# Patient Record
Sex: MALE | Race: WHITE | Employment: FULL TIME | URBAN - METROPOLITAN AREA
[De-identification: names, ages, dates, MRNs, and addresses within clinical notes are randomized per-mention and may not be internally consistent; named-entity substitution may affect disease eponyms.]

---

## 2018-05-30 ENCOUNTER — HOSPITAL ENCOUNTER (OUTPATIENT)
Dept: BEHAVIORAL HEALTH | Facility: CLINIC | Age: 33
Discharge: HOME | End: 2018-05-30
Attending: SOCIAL WORKER | Admitting: SOCIAL WORKER
Payer: COMMERCIAL

## 2018-05-30 VITALS
SYSTOLIC BLOOD PRESSURE: 118 MMHG | HEART RATE: 91 BPM | WEIGHT: 170 LBS | BODY MASS INDEX: 21.14 KG/M2 | DIASTOLIC BLOOD PRESSURE: 81 MMHG | HEIGHT: 75 IN

## 2018-05-30 PROCEDURE — H0001 ALCOHOL AND/OR DRUG ASSESS: HCPCS

## 2018-05-30 ASSESSMENT — ANXIETY QUESTIONNAIRES
3. WORRYING TOO MUCH ABOUT DIFFERENT THINGS: NEARLY EVERY DAY
2. NOT BEING ABLE TO STOP OR CONTROL WORRYING: NEARLY EVERY DAY
GAD7 TOTAL SCORE: 21
1. FEELING NERVOUS, ANXIOUS, OR ON EDGE: NEARLY EVERY DAY
6. BECOMING EASILY ANNOYED OR IRRITABLE: NEARLY EVERY DAY
4. TROUBLE RELAXING: NEARLY EVERY DAY
7. FEELING AFRAID AS IF SOMETHING AWFUL MIGHT HAPPEN: NEARLY EVERY DAY
5. BEING SO RESTLESS THAT IT IS HARD TO SIT STILL: NEARLY EVERY DAY

## 2018-05-30 NOTE — PROGRESS NOTES
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 for too long.    **Per 2016 Rule 25: accidental OD after 3 pints HL and several line of coke two months ago. His friend gave him some heroin not knowing that Jelani had already consumed 3 pints of HL.    40. Have you ever been diagnosed with Fetal Alcohol Effects or Fetal Alcohol Syndrome?  No    41. What are your some of your personal strengths?  detail-oriented, can brian in and focus, can due things well.     Dimension III Ratings Summary   Emotional/Behavioral/Cognitive - The placing authority must use the criteria in Dimension III to determine a client s emotional, behavioral, and cognitive conditions and complications.   RISK DESCRIPTIONS - Severity ratin Client has difficulty with impulse control and lacks coping skills. Client has thoughts of suicide or harm to others without means; however, the thoughts may interfere with participation in some treatment activities. Client has difficulty functioning in significant life areas. Client has moderate symptoms of emotional, behavioral, or cognitive problems. Client is able to participate in most treatment activities.    REASONS SEVERITY WAS ASSIGNED - What current issues might with thinking, feelings or behavior pose barriers to participation in a treatment program? What  "coping skills or other assets does the person have to offset those issues? Are these problems that can be initially accommodated by a treatment provider? If not, what specialized skills or attributes must a provider have?    Patient was diagnosed with depression, anxiety, and OCD. He was prescribed Prozac and Ativan. He was also prescribed Hydroxyzine as back up to Ativan. He hasn't taken them in the past year. Patient s PHQ-9 score was 27 out of 27, indicating severe depression. Patient s ÁNGEL-7 score was 21 out of 21, indicating severe anxiety. Patient reported suicidal ideation. He denied suicidal intent and plan. He denied self-injurious ideation and intent at this time. Patient reported one suicide attempts in the past year. Patient denied a history of trauma and/or abuse. He would benefit from beginning individual mental health therapy to address OCD and obsessions.      DIMENSION IV - Readiness to Change     1. What is your motivation for participating in this evaluation today?    He doesn't want to lose his job or his girlfriend.     2. What problematic behaviors have you engaged in when using alcohol or other drugs that could be hazardous to you or others (i.e. driving a car/motorcycle/boat, operating machinery, unsafe sex, IV drug use, sharing needles, etc.)  (DSM-5: Sx #8)    Driving, unsafe situations, operating machinery.   **Per 12/08/2016 Rule 25: Driving,unsafe sex,being in unsafe neighborhoods, associating with unsafe people, sharing needles, hung self outside of 22 story building    3. If applicable, when did you first think you had a problem with your alcohol or other drug use?    Age 14 - when he realized he had problem with all drugs in general. Doesn't matter the catalyst, but he needs to get more and doesn't matter which drug it is.     4. Who in your life has shared concerns with you about your use of alcohol or other drugs?    \"Myself, girlfriend, parents.\"     5. Are there any changes you " have made or plan to make regarding how you had been using alcohol or other drugs?    He is now biking to and from work daily. He is trying to stay more active at home and find projects to do. He stated he knows he needs to stop drinking completely. There is no in between. He wants an outpatient treatment. He stated that most people would suggest an inpatient treatment, but he can't do that due to his life situations. He wants to maintain his job.     Dimension IV Ratings Summary   Readiness for Change - The placing authority must use the criteria in Dimension IV to determine a client s readiness for change.   RISK DESCRIPTIONS - Severity ratin Client displays verbal compliance, but lacks consistent behaviors; has low motivation for change; and is passively involved in treatment.    REASONS SEVERITY WAS ASSIGNED - (What information did the person provide that supports your assessment of his or her readiness to change? How aware is the person of problems caused by continued use? How willing is she or he to make changes? What does the person feel would be helpful? What has the person been able to do without help?)      Patient expressed a desire to abstain from alcohol. He identified that he probably needs inpatient treatment, but does not want to miss work. He appears motivated to make changes due to his girlfriend's threat to end the relationship.      DIMENSION V - Relapse, Continued Use and Continued Problem Potential     1. If you have had previous periods of sobriety, when was your longest period of sobriety and what were you doing at that time that was supporting your sobriety?  (DSM-5: Sx #2)    5 months sober in 2016 - he stated he was fearful of going back to assisted and losing everything. He ended up losing his job and things anyway. When he thinks of drinking, he stated he needs to associate it with the negatives and not how much better he will feel for a short time.      **Per 2016 Rule 25: His  longest period of sobriety was 5 months after incident Jan 2016.  In the past has quit for a month or 2 here and there. He quit with just will power.    2. Within the past 30 days, on a scale from 0-10 (0 = having no cravings at all and 10 = having very strong cravings to use alcohol or other drugs) what number would you assign to your cravings? (DSM-5: Sx #4)     10    3. Can you identify any specific reasons or specific triggers that contribute to you being more likely to consume alcohol or other drugs? (DSM-5: Sx #4)    Stress about finances and work and deadlines. He has a hard time sleeping without alcohol. He relied on drinking to keep his sleep cycle. He now can't drink before bed because his girlfriend won't let him drink. 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 He is not sleeping much and that's adding to his stress. He stated he also has social anxiety and drinking helps him interact better.     4. Have you been treated for alcohol/other substance use disorder? (DSM-5: Sx #2)    Age 13 - treatment **Per 12/08/2016 Rule 25: He was forced to go into inpt tx at 15, he completed it but actually drank in the tx but never got caught.  Age 17 - treatment  [facility].     5. Support group participation: Have you/do you attend 12-step or other support group meetings? How recently? What was your experience? Are you willing to restart? If the person has not participated, is he or she willing?  (DSM-5: Sx #2)    He goes to AA once every couple of months. He never really thought about getting a sponsor.      6. Do you drink alcohol or use other drugs in larger amounts than intended or over a longer period of time than was intended?  (DSM-5: Sx #1)    Yes, explain: he drinks over the course of the day.     7. Do you spend a great deal of time engaged in activities necessary to obtain alcohol or other drugs, a great deal of time using alcohol or other drugs, or a great deal of time  recovering from alcohol or other drug use?  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 He drinks at work, has led meetings after drinking a pint.     Dimension V Ratings Summary   Relapse/Continued Use/Continued problem potential - The placing authority must use the criteria in Dimension V to determine a client s relapse, continued use, and continued problem potential.   RISK DESCRIPTIONS - Severity ratin No awareness of the negative impact of mental health problems or substance abuse. No coping skills to arrest mental health or addiction illnesses, or prevent relapse.    REASONS SEVERITY WAS ASSIGNED - (What information did the person provide that indicates his or her understanding of relapse issues? What about the person s experience indicates how prone he or she is to relapse? What coping skills does the person have that decrease relapse potential?)      Patient reported three past treatments and no support group attendance. He reported having minimal sober time. He has tried to quit drinking and using in the past but returned to use. Patient lacks knowledge of the addiction cycle. He lacks insight into his personal relapse process along with warning signs and triggers. Patient lacks insight into the effects his use has had on his physical and mental health. He lacks impulse control, sober coping skills, and long-term sober maintenance skills. Patient is at a high risk for relapse/continued use. He has untreated co-occurring mental health and substance use issues, which places him at higher risk for relapse.      DIMENSION VI - Recovery Environment     1. Are you employed or attending school?    Patient works 50 hours per week at [employer]. Hobbies: build computers, run [employer], biking, [employer] machines.     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 He hates his work. Before he was a , which he like a lot better. M-F, he denies affects at work.       2. If working or a student, are you able to function appropriately in that setting?     Yes    3. Has your job and/or school work been negatively impacted by your use of alcohol of other drugs?  (DSM-5: Sx #5 & Sx #7)    Patient reported that use has negatively impacted his work. He was pulled aside because after lunch he has messed up many things. He drinks during lunch. Lately, his work has been better because he paces his drinking. He starts drinking at 10am and paces until 3pm. He doesn't drink more when he gets home because he can't drink.     4. How would you describe your current finances?  In serious debt     5. Are you having financial problems, such as money being tight, living paycheck to paycheck, having unpaid or late bills, having significant debt, a history of bankruptcy, or IRS problems?    Falling behind on bills. His girlfriend has $60K in student loans.       6. Describe a typical day; evening for you. Work, school, social, leisure activities, volunteer, exercise, spiritual practices or other daily tasks.    Wake up at 6am, bike to work, work at 7:30, at 10am break he walks to a liquor store 2 blocks away, buys a 1/2 pint, goes back to work, finishes it by lunch, on lunch grab more liquor, it's gone by the end of work, leave work at 4pm, home by 4:45, hang out, try to nap, clean, go to bed at 2am. In the evening, he will use cocaine to stay awake. His cocaine use had been daily, but it's harder to get now and so he uses once a week.       7. Have you reduced or discontinued recreational activities, hobbies or other leisure activities as a result of your use of alcohol or other drugs?  (DSM-5: Sx #7)    Patient reported that use has prevented him from completing daily tasks.    8. Who do you live with?      His lives with his girlfriend. Her 12 year old son  comes over. No other children.     9. Are there any people in the home who have current substance abuse issues or have mental health issues?     She rarely drinks. She smokes marijuana daily and it is not a problem for her. And he is not tempted to use marijuana.     10. Tell me about your living environment/neighborhood? Ask enough follow up questions to determine safety, criminal activity, availability of alcohol and drugs, supportive or antagonistic to the person making changes.      Patient reported his neighborhood is safe. He reported it's easy to get both alcohol and cocaine.     11. Are you concerned for your safety or anyone else's safety in the home? No    12. Do you have plans to move somewhere else or change your living environment in any manner?    No     13. Do you have children who live with you?     His girlfriend's 12 year old son comes over. No other children.     14. Do you have children who do not live with you?     She has an 18 year old daughter.     15. Do you have any history of being involved with Child Protection Services? No     16. Are you currently in a significant relationship?     Dating for 9 years. Never been .     17. How do you identify your sexual orientation?    Heterosexual    18. The patient reported: living with significant other/partner.    19. Does your significant other have a history of substance abuse or have current substance abuse issues?    She rarely drinks. She smokes marijuana daily and it is not a problem for her. And he is not tempted to use marijuana.     20. How important is substance use to your social connections? Do many of your family or friends use?     He would still be able to be friends with them and be sober, but he feels the need drink to feel more social. He stated that about half of his current coworkers are completely sober. **Per 12/08/2016 Rule 25: Almost always alone. Even when he is with other people, he will sneak and have other drinks  on the side.     21. Who in your life would you consider to be your primary support network at this time?    Girlfriend, her father is a recovered alcoholic, his parents, and some sober friends.     22. Have any of your relationships (S.O., family members, friends, employers, teachers, etc.) been negatively impacted by your use of alcohol or other drugs?  (DSM-5: Sx #6)    She is threatening to leave.     23. Do you currently participate in community ruperto activities, such as attending [facility], temple, [employer]ism or [facility] services?  No    24. Criminal justice history: Gather current/recent history and any significant history related to substance use--Arrests? Convictions? Circumstances? Alcohol or drug involvement? Sentences? Still on probation or parole? Expectations of the court? Current court order?  (DSM-5: Sx #8)    - DUI - 2 -  and .   - Patient denied current legal involvement.    25. Are you or have you ever been a registered sex offender?  No    26. Do you have a child protection worker,  or ?  No    27. Are you currently on any type of commitment? No    28. Do you have a valid 's license? No - it was revoked due to no insurance.      29. What obstacles exist to participating in treatment? (Time off work, childcare, funding, transportation, pending care home time, living situation)     He doesn't want to miss work.     Dimension VI Ratings Summary   Recovery environment - The placing authority must use the criteria in Dimension VI to determine a client s recovery environment.   RISK DESCRIPTIONS - Severity ratin Client is engaged in structured, meaningful activity, but peers, family, significant other, and living environment are unsupportive, or there is criminal justice involvement by the client or among the client s peers, significant others, or in the client s living environment.    REASONS SEVERITY WAS ASSIGNED - (What support does the person have for making  changes? What structure/stability does the person have in his or her daily life that will increase the likelihood that changes can be sustained? What problems exist in the person s environment that will jeopardize getting/staying clean and sober?)     Patient lives with his girlfriend. His current living situation is supportive toward recovery from alcohol and cocaine. He reported having relationship conflict with her due to his ongoing substance use. He reported that most of his use of alcohol has been done alone while at work. Patient lacks a current sober support network. He denied having any concerns regarding immediate living environment or neighborhood. Patient is employed full-time, but lacks a daily structure and meaningful activities. He reported legal issues of 2 past DUIs. Patient denied current legal involvement.      Mental Health Status   Physical Appearance/Attire: Appears younger than stated age and Disheveled   Hygiene: neglected grooming-unclean body, hair, teeth   Eye Contact: at examiner   Speech Rate:  regular   Speech Volume: regular   Speech Quality: fluid   Cognitive/Perceptual:  distorted (phobias, obsessions)   Cognition: memory intact    Judgment: intact   Insight: intact   Orientation:  time, place, person and situation   Thought:   logical    Hallucinations:  none   General Behavioral Tone: cooperative, tense, dramatic and impulsive   Psychomotor Activity: hyperactive and agitated   Gait:  no problem   Mood: anxious, sad, depressed and hopeless   Affect: congruence/appropriate   Counselor Notes: NA     Patient Choices/Exceptions     Would you like services specific to language, age, gender, culture, Gnosticism preference, race, ethnicity, sexual orientation or disability?  No    What particular treatment choices and options would you like to have? [place]    Do you have a preference for a particular treatment program? He wants an outpatient treatment. He stated that most people would  suggest an inpatient treatment, but he can't do that due to his life situations. He wants to maintain his job.     Patient is willing to follow treatment recommendations.  Yes    DSM-5 Criteria for Substance Use Disorder   Criteria for Diagnosis  Instructions: Determine whether the client currently meets the criteria for Substance Use Disorder using the diagnostic criteria in the DSM-5 pp.[id number]. Current means during the most recent 12 months outside a facility that controls access to substances.    A problematic pattern of alcohol/drug use leading to clinically significant impairment or distress, as manifested by at least two of the following, occurring within a 12-month period: 10/11    1. Alcohol/drug is often taken in larger amounts or over a longer period than was intended.  2. There is a persistent desire or unsuccessful efforts to cut down or control alcohol/drug use  3. A great deal of time is spent in activities necessary to obtain alcohol/drug, use alcohol/drug, or recover from its effects.  4. Craving, or a strong desire or urge to use alcohol/drug  5. Recurrent alcohol/drug use resulting in a failure to fulfill major role obligations at work, school or home.  6. Continued alcohol use despite having persistent or recurrent social or interpersonal problems caused or exacerbated by the effects of alcohol/drug.  7. Important social, occupational, or recreational activities are given up or reduced because of alcohol/drug use.  8. Recurrent alcohol/drug use in situations in which it is physically hazardous.  9. Alcohol/drug use is continued despite knowledge of having a persistent or recurrent physical or psychological problem that is likely to have been caused or exacerbated by alcohol.  10. Tolerance, as defined by either of the following: A need for markedly increased amounts of alcohol/drug to achieve intoxication or desired effect.      Specify if: In early remission:  After full criteria for  alcohol/drug use disorder were previously met, none of the criteria for alcohol/drug use disorder have been met for at least 3 months but for less than 12 months (with the exception that Criterion A4,  Craving or a strong desire or urge to use alcohol/drug  may be met).     In sustained remission:   After full criteria for alcohol use disorder were previously met, non of the criteria for alcohol/drug use disorder have been met at any time during a period of 12 months or longer (with the exception that Criterion A4,  Craving or strong desire or urge to use alcohol/drug  may be met).   Specify if:   This additional specifier is used if the individual is in an environment where access to alcohol is restricted.    Mild: Presence of 2-3 symptoms  Moderate: Presence of 4-5 symptoms  Severe: Presence of 6 or more symptoms    DSM-5 Substance Use Disorder Diagnosis     Alcohol Use Disorder Severe - 303.90 (F10.20)  Cocaine Use Disorder Moderate - 304.20 (F14.20)  History of Depression, Anxiety, and OCD - per patient self-report    Collateral Contact Summary     Number of contacts made:  1  Contact with referring person:  NA  If court related records were reviewed, summarize here:  NA    Collateral Contact      Name: Relationship: Phone number: Releases:   Manuela Miller Girlfriend [phone] Yes     On 05/31/2018, Therapist spoke with Ms. Miller. She reported that she is unsure how much or how often patient drinks. She doesn't know about other drug use. Patient hides his drinking and is not honest. She is worried about him as a danger to himself or others. He doesn't stop when he drinks and he has poor impulse control. She has a rule that no one drinks in the house. Ms. Miller reported that patient drank in outpatient treatment before and she thinks he needs inpatient treatment. She thinks he is going to do treatment because she is threatening to leave; she wants him to do it for himself. Patient believes he has schizophrenia or  OCD. She stated she doesn't know if he has it. He gets depressed and paranoid after drinking for a few days. She stated that he has never been prescribed antipsychotics, only antidepressants, which leads her to believe that he doesn't have schizophrenia or OCD. Patient has stolen money from her. She works 60 to 70 hours per week and cannot monitor him and she does not have time to attend [facility].         **Per 12/08/2016 Rule 25: She said she has been concerned about his drinking for at least 5 years. She said he drinks about 4-5 x's a week 1/2 pint to 1 liter of HL in a setting. She said he slurs his words, has red eyes, passes out, gets more aggressive. She continues to confront him several times but nothing changes. She has been concerned since the incident in Jan 2016. She said he has drank before and at work. She said some times when he is drunk he will get in her car at night. So she always takes the car keys with her when she goes to bed. She thinks his friends are drinkers and not that supportive, they often suggest that he just have one drink. She said in the last month his drinking has gotten worse since the OD of a friend and a cousin having a stroke.     Collateral Contact      Name: Relationship: Phone number: Releases:   Bhavani Mcclain Mother [phone] Yes       Recommendations     It is recommended that patient:  1). Participate in and complete a co-occurring lodging/residential substance use treatment program.     2). Follow all subsequent recommendations of the substance use treatment providers.   3). Abstain from alcohol and all mood-altering substances, except as prescribed. Take all medications as prescribed.   4). Attend AA at least four times weekly and obtain a male sponsor for additional sober supports. Consider attending [facility] to address effects of alcohol from family of origin.  5). Become involved in a daily sober recreational activity/hobby of his own interest.  6). Have a mental health  evaluation to determine accurate diagnoses and which psychotropic medications would be effective. Discuss with a doctor/psychiatrist the use of Naltrexone, Campral, or Antabuse to decrease cravings and assist with sobriety.   7). Begin weekly individual mental health therapy to address obsessive thinking.  8). Work with primary counselor to address suicidal ideation and complete a Patient Safety Plan.     Level of Care   I.) Intoxication and Withdrawal: 1   II.) Biomedical:  1   III.) Emotional and Behavioral:  2   IV.) Readiness to Change:  2   V.) Relapse Potential: 4   VI.) Recovery Environmental: 2     Initial Problem List     The patient lacks relapse prevention skills  The patient has poor coping skills  The patient lacks a sober peer support network  The patient has dual issues of MI and CD  The patient lacks the ability to effectively manage his/her mental health issues  The patient has a significant history of guilt and shame issues

## 2018-05-30 NOTE — PROGRESS NOTES
"COMPREHENSIVE ASSESSMENT SUMMARY    PATIENT NAME: Jelani Mcclain Jr.  MEDICAL RECORD NUMBER: [id number]  PATIENT ADDRESS: [address] [place] [address]  HOME TELEPHONE NUMBER: [phone] (home)   STATISTICS: YOB: 1985     Age: 33 year old     Gender: male    RELATIONSHIP STATUS:  Living with significant other    DATE OF ASSESSMENT: 05/30/2018  EVALUATION COUNSELOR: Teresita Wills    REFERRAL SOURCE: Self    REASON FOR EVALUATION:     On 05/30/2018, Mr. Mcclain presented to [facility] for a Comprehensive Assessment for substance use. He reported \"there is a pattern of things that have not stopped.\" He would prefer to stop the pattern. The major contributor is his girlfriend of 9 years said he needs to get his \"shit under control.\" He stated he is a high functioning alcoholic. He can maintain a job, but she doesn't know if or when he will be in assisted or get violent.     HEALTH HISTORY AND MEDICATIONS:     Patient reported he has high blood pressure. He denied taking medications. Patient denied having any chronic biomedical conditions that would interfere with treatment. He reported having pain in his back, with a pain level of a 8 from 0-10. It is recommended patient obtain a primary care doctor and have an annual physical.      HISTORY OF PREVIOUS TREATMENT AND COUNSELING:     - Age 13 - treatment **Per 12/08/2016 Rule 25: He was forced to go into in tx at 15, he completed it but actually drank in the tx but never got caught.  - Age 17 - treatment  - [facility] - 2 years ago.     HISTORY OF ALCOHOL AND DRUG USE:     - Alcohol - Current Use - daily drinks at least 1/2 pint of vodka per day. Longest period of sobriety - 5 months in early 2016. LAST USE: 30 minutes ago. He has consumed 1/4 pint already  - Cocaine - In 2018 - he was snorting daily until 04/2018. Current Use - \"sporadic use\" and uses approximately 1/10g once a week. LAST USE: 05/29/18, 1/10g    SUMMARY OF SUBSTANCE USE " DISORDER SYMPTOMS ACKNOWLEDGED BY THE PATIENT: The patient identified positively with 10 of the 11 DSM-5 criteria for a primary diagnostic impression of substance use disorder severe.     SUMMARY OF COLLATERAL DATA:    On 05/31/2018, Therapist spoke with Ms. Miller. She reported that she is unsure how much or how often patient drinks. She doesn't know about other drug use. Patient hides his drinking and is not honest. She is worried about him as a danger to himself or others. He doesn't stop when he drinks and he has poor impulse control. She has a rule that no one drinks in the house. Ms. Miller reported that patient drank in outpatient treatment before and she thinks he needs inpatient treatment. She thinks he is going to do treatment because she is threatening to leave; she wants him to do it for himself. Patient believes he has schizophrenia or OCD. She stated she doesn't know if he has it. He gets depressed and paranoid after drinking for a few days. She stated that he has never been prescribed antipsychotics, only antidepressants, which leads her to believe that he doesn't have schizophrenia or OCD. Patient has stolen money from her. She works 60 to 70 hours per week and cannot monitor him and she does not have time to attend [facility].         **Per 12/08/2016 Rule 25: She said she has been concerned about his drinking for at least 5 years. She said he drinks about 4-5 x's a week 1/2 pint to 1 liter of HL in a setting. She said he slurs his words, has red eyes, passes out, gets more aggressive. She continues to confront him several times but nothing changes. She has been concerned since the incident in Jan 2016. She said he has drank before and at work. She said some times when he is drunk he will get in her car at night. So she always takes the car keys with her when she goes to bed. She thinks his friends are drinkers and not that supportive, they often suggest that he just have one drink. She said in the last  month his drinking has gotten worse since the OD of a friend and a cousin having a stroke.     IMPRESSION:    Alcohol Use Disorder Severe - 303.90 (F10.20)  Cocaine Use Disorder Moderate - 304.20 (F14.20)  History of Depression, Anxiety, and OCD - per patient self-report    [facility] PLACEMENT CRITERIA:    DIMENSION 1: Intoxication and Withdrawal:  The patient scored a 1.    Patient does not appear at risk of having significant withdrawal symptoms at this time. He denied ever having feelings of withdrawal. He reports that his last use of alcohol was 30 minutes prior to evaluation. Patient was administered a breathalyzer during the evaluation and the GRICELDA was 0.043. Patient was also administered an urinalysis during the evaluation and the UA was positive for cocaine. If patient continues to drink, it is recommended that patient be assessed for detox despite his denial of withdrawal symptoms. At the time of the Substance Use Evaluation his blood pressure was 118/81 and pulse was 91 BPM.     DIMENSION 2: Biomedical Conditions:  The patient scored a 1.    Patient reported he has high blood pressure. He denied taking medications. Patient denied having any chronic biomedical conditions that would interfere with treatment. He reported having pain in his back, with a pain level of a 8 from 0-10. It is recommended patient obtain a primary care doctor and have an annual physical.      DIMENSION 3: Emotional and Behavioral:  The patient scored a 2.    Patient was diagnosed with depression, anxiety, and OCD. He was prescribed Prozac and Ativan. He was also prescribed Hydroxyzine as back up to Ativan. He hasn't taken them in the past year. Patient s PHQ-9 score was 27 out of 27, indicating severe depression. Patient s ÁNGEL-7 score was 21 out of 21, indicating severe anxiety. Patient reported suicidal ideation. He denied suicidal intent and plan. He denied self-injurious ideation and intent at this time. Patient reported one suicide  attempts in the past year. Patient denied a history of trauma and/or abuse. He would benefit from beginning individual mental health therapy to address OCD and obsessions.     DIMENSION 4: Readiness to Change:  The patient scored a 2.    Patient expressed a desire to abstain from alcohol. He identified that he probably needs inpatient treatment, but does not want to miss work. He appears motivated to make changes due to his girlfriend's threat to end the relationship.      DIMENSION 5: Relapse Potential:  The patient scored a 4.    Patient reported three past treatments and no support group attendance. He reported having minimal sober time. He has tried to quit drinking and using in the past but returned to use. Patient lacks knowledge of the addiction cycle. He lacks insight into his personal relapse process along with warning signs and triggers. Patient lacks insight into the effects his use has had on his physical and mental health. He lacks impulse control, sober coping skills, and long-term sober maintenance skills. Patient is at a high risk for relapse/continued use. He has untreated co-occurring mental health and substance use issues, which places him at higher risk for relapse.     DIMENSION 6: Recovery Environment:  The patient scored a 3.    Patient lives with his girlfriend. His current living situation is supportive toward recovery from alcohol and cocaine. He reported having relationship conflict with her due to his ongoing substance use. He reported that most of his use of alcohol has been done alone while at work. Patient lacks a current sober support network. He denied having any concerns regarding immediate living environment or neighborhood. Patient is employed full-time, but lacks a daily structure and meaningful activities. He reported legal issues of 2 past DUIs. Patient denied current legal involvement.     RECOMMENDATIONS:    It is recommended that patient:  1). Participate in and complete a  co-occurring lodging/residential substance use treatment program.     2). Follow all subsequent recommendations of the substance use treatment providers.   3). Abstain from alcohol and all mood-altering substances, except as prescribed. Take all medications as prescribed.   4). Attend AA at least four times weekly and obtain a male sponsor for additional sober supports. Consider attending [facility] to address effects of alcohol from family of origin.  5). Become involved in a daily sober recreational activity/hobby of his own interest.  6). Have a mental health evaluation to determine accurate diagnoses and which psychotropic medications would be effective. Discuss with a doctor/psychiatrist the use of Naltrexone, Campral, or Antabuse to decrease cravings and assist with sobriety.   7). Begin weekly individual mental health therapy to address obsessive thinking.  8). Work with primary counselor to address suicidal ideation and complete a Patient Safety Plan.      INITIAL PROBLEM LIST:    The patient lacks relapse prevention skills  The patient has poor coping skills  The patient lacks a sober peer support network  The patient has dual issues of MI and CD  The patient lacks the ability to effectively manage his/her mental health issues  The patient has a significant history of guilt and shame issues    This information has been disclosed to you from records protected by Federal confidentiality rules (42 CFR part 2). The Federal rules prohibit you from making any further disclosure of this information unless further disclosure is expressly permitted by the written consent of the person to whom it pertains or as otherwise permitted by 42 CFR part 2. A general authorization for the release of medical or other information is NOT sufficient for this purpose. The Federal rules restrict any use of the information to criminally investigate or prosecute any alcohol or drug abuse patient.

## 2018-05-31 ASSESSMENT — PATIENT HEALTH QUESTIONNAIRE - PHQ9: SUM OF ALL RESPONSES TO PHQ QUESTIONS 1-9: 27

## 2018-06-01 ASSESSMENT — ANXIETY QUESTIONNAIRES: GAD7 TOTAL SCORE: 21

## 2019-01-02 ENCOUNTER — TRANSFERRED RECORDS (OUTPATIENT)
Dept: HEALTH INFORMATION MANAGEMENT | Facility: CLINIC | Age: 34
End: 2019-01-02

## 2019-04-25 ENCOUNTER — HOSPITAL ENCOUNTER (INPATIENT)
Facility: CLINIC | Age: 34
LOS: 2 days | Discharge: HOME | DRG: 897 | End: 2019-04-27
Attending: EMERGENCY MEDICINE | Admitting: INTERNAL MEDICINE
Payer: COMMERCIAL

## 2019-04-25 DIAGNOSIS — E87.6 HYPOKALEMIA: ICD-10-CM

## 2019-04-25 DIAGNOSIS — F10.939 ALCOHOL WITHDRAWAL SYNDROME WITH COMPLICATION (H): ICD-10-CM

## 2019-04-25 DIAGNOSIS — F42.9 OBSESSIVE-COMPULSIVE DISORDER, UNSPECIFIED TYPE: ICD-10-CM

## 2019-04-25 DIAGNOSIS — F43.22 ADJUSTMENT DISORDER WITH ANXIOUS MOOD: ICD-10-CM

## 2019-04-25 DIAGNOSIS — R12 HEARTBURN: ICD-10-CM

## 2019-04-25 DIAGNOSIS — F10.930 ALCOHOL WITHDRAWAL SYNDROME WITHOUT COMPLICATION (H): Primary | ICD-10-CM

## 2019-04-25 LAB
ALBUMIN SERPL-MCNC: 4.2 G/DL (ref 3.4–5)
ALP SERPL-CCNC: 85 U/L (ref 40–150)
ALT SERPL W P-5'-P-CCNC: 44 U/L (ref 0–70)
ANION GAP SERPL CALCULATED.3IONS-SCNC: 5 MMOL/L (ref 3–14)
ANION GAP SERPL CALCULATED.3IONS-SCNC: 8 MMOL/L (ref 3–14)
AST SERPL W P-5'-P-CCNC: 36 U/L (ref 0–45)
BASOPHILS # BLD AUTO: 0 10E9/L (ref 0–0.2)
BASOPHILS NFR BLD AUTO: 0.3 %
BILIRUB SERPL-MCNC: 0.4 MG/DL (ref 0.2–1.3)
BUN SERPL-MCNC: 12 MG/DL (ref 7–30)
BUN SERPL-MCNC: 14 MG/DL (ref 7–30)
CALCIUM SERPL-MCNC: 8.5 MG/DL (ref 8.5–10.1)
CALCIUM SERPL-MCNC: 9 MG/DL (ref 8.5–10.1)
CHLORIDE SERPL-SCNC: 106 MMOL/L (ref 94–109)
CHLORIDE SERPL-SCNC: 108 MMOL/L (ref 94–109)
CO2 SERPL-SCNC: 28 MMOL/L (ref 20–32)
CO2 SERPL-SCNC: 29 MMOL/L (ref 20–32)
CREAT SERPL-MCNC: 0.9 MG/DL (ref 0.66–1.25)
CREAT SERPL-MCNC: 1.05 MG/DL (ref 0.66–1.25)
DIFFERENTIAL METHOD BLD: NORMAL
EOSINOPHIL # BLD AUTO: 0.1 10E9/L (ref 0–0.7)
EOSINOPHIL NFR BLD AUTO: 1.1 %
ERYTHROCYTE [DISTWIDTH] IN BLOOD BY AUTOMATED COUNT: 13.8 % (ref 10–15)
ETHANOL SERPL-MCNC: 0.26 G/DL
GFR SERPL CREATININE-BSD FRML MDRD: >90 ML/MIN/{1.73_M2}
GFR SERPL CREATININE-BSD FRML MDRD: >90 ML/MIN/{1.73_M2}
GLUCOSE SERPL-MCNC: 123 MG/DL (ref 70–99)
GLUCOSE SERPL-MCNC: 84 MG/DL (ref 70–99)
HCT VFR BLD AUTO: 44.8 % (ref 40–53)
HGB BLD-MCNC: 15.7 G/DL (ref 13.3–17.7)
IMM GRANULOCYTES # BLD: 0 10E9/L (ref 0–0.4)
IMM GRANULOCYTES NFR BLD: 0.2 %
INR PPP: 0.92 (ref 0.86–1.14)
INTERPRETATION ECG - MUSE: NORMAL
LIPASE SERPL-CCNC: 166 U/L (ref 73–393)
LYMPHOCYTES # BLD AUTO: 3 10E9/L (ref 0.8–5.3)
LYMPHOCYTES NFR BLD AUTO: 48.2 %
MAGNESIUM SERPL-MCNC: 2.5 MG/DL (ref 1.6–2.3)
MCH RBC QN AUTO: 30 PG (ref 26.5–33)
MCHC RBC AUTO-ENTMCNC: 35 G/DL (ref 31.5–36.5)
MCV RBC AUTO: 86 FL (ref 78–100)
MONOCYTES # BLD AUTO: 0.6 10E9/L (ref 0–1.3)
MONOCYTES NFR BLD AUTO: 9.6 %
NEUTROPHILS # BLD AUTO: 2.5 10E9/L (ref 1.6–8.3)
NEUTROPHILS NFR BLD AUTO: 40.6 %
PLATELET # BLD AUTO: 279 10E9/L (ref 150–450)
POTASSIUM SERPL-SCNC: 3.2 MMOL/L (ref 3.4–5.3)
POTASSIUM SERPL-SCNC: 3.9 MMOL/L (ref 3.4–5.3)
PROT SERPL-MCNC: 8.9 G/DL (ref 6.8–8.8)
RBC # BLD AUTO: 5.24 10E12/L (ref 4.4–5.9)
SODIUM SERPL-SCNC: 142 MMOL/L (ref 133–144)
SODIUM SERPL-SCNC: 142 MMOL/L (ref 133–144)
WBC # BLD AUTO: 6.3 10E9/L (ref 4–11)

## 2019-04-25 PROCEDURE — 85025 COMPLETE CBC W/AUTO DIFF WBC: CPT | Performed by: EMERGENCY MEDICINE

## 2019-04-25 PROCEDURE — 85610 PROTHROMBIN TIME: CPT | Performed by: EMERGENCY MEDICINE

## 2019-04-25 PROCEDURE — 25000132 ZZH RX MED GY IP 250 OP 250 PS 637: Performed by: INTERNAL MEDICINE

## 2019-04-25 PROCEDURE — 96376 TX/PRO/DX INJ SAME DRUG ADON: CPT

## 2019-04-25 PROCEDURE — 93005 ELECTROCARDIOGRAM TRACING: CPT

## 2019-04-25 PROCEDURE — 25800030 ZZH RX IP 258 OP 636: Performed by: EMERGENCY MEDICINE

## 2019-04-25 PROCEDURE — 96374 THER/PROPH/DIAG INJ IV PUSH: CPT

## 2019-04-25 PROCEDURE — 99285 EMERGENCY DEPT VISIT HI MDM: CPT | Mod: 25

## 2019-04-25 PROCEDURE — 99221 1ST HOSP IP/OBS SF/LOW 40: CPT | Performed by: PSYCHIATRY & NEUROLOGY

## 2019-04-25 PROCEDURE — 25000128 H RX IP 250 OP 636: Performed by: EMERGENCY MEDICINE

## 2019-04-25 PROCEDURE — 25800030 ZZH RX IP 258 OP 636: Performed by: INTERNAL MEDICINE

## 2019-04-25 PROCEDURE — 12000000 ZZH R&B MED SURG/OB

## 2019-04-25 PROCEDURE — 99223 1ST HOSP IP/OBS HIGH 75: CPT | Mod: AI | Performed by: INTERNAL MEDICINE

## 2019-04-25 PROCEDURE — 83690 ASSAY OF LIPASE: CPT | Performed by: EMERGENCY MEDICINE

## 2019-04-25 PROCEDURE — 83735 ASSAY OF MAGNESIUM: CPT | Performed by: EMERGENCY MEDICINE

## 2019-04-25 PROCEDURE — 25000132 ZZH RX MED GY IP 250 OP 250 PS 637: Performed by: EMERGENCY MEDICINE

## 2019-04-25 PROCEDURE — HZ2ZZZZ DETOXIFICATION SERVICES FOR SUBSTANCE ABUSE TREATMENT: ICD-10-PCS | Performed by: EMERGENCY MEDICINE

## 2019-04-25 PROCEDURE — 96361 HYDRATE IV INFUSION ADD-ON: CPT

## 2019-04-25 PROCEDURE — 80320 DRUG SCREEN QUANTALCOHOLS: CPT | Performed by: EMERGENCY MEDICINE

## 2019-04-25 PROCEDURE — 25000132 ZZH RX MED GY IP 250 OP 250 PS 637: Performed by: PSYCHIATRY & NEUROLOGY

## 2019-04-25 PROCEDURE — 80048 BASIC METABOLIC PNL TOTAL CA: CPT | Performed by: INTERNAL MEDICINE

## 2019-04-25 PROCEDURE — 80053 COMPREHEN METABOLIC PANEL: CPT | Performed by: EMERGENCY MEDICINE

## 2019-04-25 PROCEDURE — 99207 ZZC APP CREDIT; MD BILLING SHARED VISIT: CPT | Performed by: INTERNAL MEDICINE

## 2019-04-25 PROCEDURE — 36415 COLL VENOUS BLD VENIPUNCTURE: CPT | Performed by: INTERNAL MEDICINE

## 2019-04-25 RX ORDER — MULTIPLE VITAMINS W/ MINERALS TAB 9MG-400MCG
1 TAB ORAL DAILY
Status: DISCONTINUED | OUTPATIENT
Start: 2019-04-25 | End: 2019-04-27 | Stop reason: HOSPADM

## 2019-04-25 RX ORDER — POTASSIUM CHLORIDE 1500 MG/1
20-40 TABLET, EXTENDED RELEASE ORAL
Status: DISCONTINUED | OUTPATIENT
Start: 2019-04-25 | End: 2019-04-27 | Stop reason: HOSPADM

## 2019-04-25 RX ORDER — ONDANSETRON 4 MG/1
4 TABLET, ORALLY DISINTEGRATING ORAL EVERY 6 HOURS PRN
Status: DISCONTINUED | OUTPATIENT
Start: 2019-04-25 | End: 2019-04-27 | Stop reason: HOSPADM

## 2019-04-25 RX ORDER — POTASSIUM CHLORIDE 7.45 MG/ML
10 INJECTION INTRAVENOUS
Status: DISCONTINUED | OUTPATIENT
Start: 2019-04-25 | End: 2019-04-27 | Stop reason: HOSPADM

## 2019-04-25 RX ORDER — FOLIC ACID 1 MG/1
1 TABLET ORAL DAILY
Status: DISCONTINUED | OUTPATIENT
Start: 2019-04-25 | End: 2019-04-27 | Stop reason: HOSPADM

## 2019-04-25 RX ORDER — LANOLIN ALCOHOL/MO/W.PET/CERES
100 CREAM (GRAM) TOPICAL ONCE
Status: COMPLETED | OUTPATIENT
Start: 2019-04-25 | End: 2019-04-25

## 2019-04-25 RX ORDER — ACETAMINOPHEN 650 MG/1
650 SUPPOSITORY RECTAL EVERY 4 HOURS PRN
Status: DISCONTINUED | OUTPATIENT
Start: 2019-04-25 | End: 2019-04-27 | Stop reason: HOSPADM

## 2019-04-25 RX ORDER — QUETIAPINE FUMARATE 50 MG/1
50 TABLET, FILM COATED ORAL AT BEDTIME
Status: DISCONTINUED | OUTPATIENT
Start: 2019-04-25 | End: 2019-04-27 | Stop reason: HOSPADM

## 2019-04-25 RX ORDER — BISACODYL 10 MG
10 SUPPOSITORY, RECTAL RECTAL DAILY PRN
Status: DISCONTINUED | OUTPATIENT
Start: 2019-04-25 | End: 2019-04-27 | Stop reason: HOSPADM

## 2019-04-25 RX ORDER — DIAZEPAM 5 MG
10 TABLET ORAL EVERY 30 MIN PRN
Status: DISCONTINUED | OUTPATIENT
Start: 2019-04-25 | End: 2019-04-27 | Stop reason: HOSPADM

## 2019-04-25 RX ORDER — AMOXICILLIN 250 MG
2 CAPSULE ORAL 2 TIMES DAILY PRN
Status: DISCONTINUED | OUTPATIENT
Start: 2019-04-25 | End: 2019-04-27 | Stop reason: HOSPADM

## 2019-04-25 RX ORDER — MAGNESIUM SULFATE HEPTAHYDRATE 40 MG/ML
4 INJECTION, SOLUTION INTRAVENOUS EVERY 4 HOURS PRN
Status: DISCONTINUED | OUTPATIENT
Start: 2019-04-25 | End: 2019-04-27 | Stop reason: HOSPADM

## 2019-04-25 RX ORDER — POTASSIUM CL/LIDO/0.9 % NACL 10MEQ/0.1L
10 INTRAVENOUS SOLUTION, PIGGYBACK (ML) INTRAVENOUS
Status: DISCONTINUED | OUTPATIENT
Start: 2019-04-25 | End: 2019-04-27 | Stop reason: HOSPADM

## 2019-04-25 RX ORDER — ESCITALOPRAM OXALATE 10 MG/1
10 TABLET ORAL DAILY
Status: DISCONTINUED | OUTPATIENT
Start: 2019-04-25 | End: 2019-04-27 | Stop reason: HOSPADM

## 2019-04-25 RX ORDER — ONDANSETRON 2 MG/ML
4 INJECTION INTRAMUSCULAR; INTRAVENOUS EVERY 6 HOURS PRN
Status: DISCONTINUED | OUTPATIENT
Start: 2019-04-25 | End: 2019-04-27 | Stop reason: HOSPADM

## 2019-04-25 RX ORDER — ACETAMINOPHEN 325 MG/1
650 TABLET ORAL EVERY 4 HOURS PRN
Status: DISCONTINUED | OUTPATIENT
Start: 2019-04-25 | End: 2019-04-27 | Stop reason: HOSPADM

## 2019-04-25 RX ORDER — FOLIC ACID 1 MG/1
1 TABLET ORAL ONCE
Status: COMPLETED | OUTPATIENT
Start: 2019-04-25 | End: 2019-04-25

## 2019-04-25 RX ORDER — SODIUM CHLORIDE, SODIUM LACTATE, POTASSIUM CHLORIDE, CALCIUM CHLORIDE 600; 310; 30; 20 MG/100ML; MG/100ML; MG/100ML; MG/100ML
INJECTION, SOLUTION INTRAVENOUS CONTINUOUS
Status: DISCONTINUED | OUTPATIENT
Start: 2019-04-25 | End: 2019-04-26 | Stop reason: CLARIF

## 2019-04-25 RX ORDER — POLYETHYLENE GLYCOL 3350 17 G/17G
17 POWDER, FOR SOLUTION ORAL DAILY PRN
Status: DISCONTINUED | OUTPATIENT
Start: 2019-04-25 | End: 2019-04-27 | Stop reason: HOSPADM

## 2019-04-25 RX ORDER — PROCHLORPERAZINE 25 MG
25 SUPPOSITORY, RECTAL RECTAL EVERY 12 HOURS PRN
Status: DISCONTINUED | OUTPATIENT
Start: 2019-04-25 | End: 2019-04-27 | Stop reason: HOSPADM

## 2019-04-25 RX ORDER — NALOXONE HYDROCHLORIDE 0.4 MG/ML
.1-.4 INJECTION, SOLUTION INTRAMUSCULAR; INTRAVENOUS; SUBCUTANEOUS
Status: DISCONTINUED | OUTPATIENT
Start: 2019-04-25 | End: 2019-04-27 | Stop reason: HOSPADM

## 2019-04-25 RX ORDER — PROCHLORPERAZINE MALEATE 5 MG
10 TABLET ORAL EVERY 6 HOURS PRN
Status: DISCONTINUED | OUTPATIENT
Start: 2019-04-25 | End: 2019-04-27 | Stop reason: HOSPADM

## 2019-04-25 RX ORDER — LANOLIN ALCOHOL/MO/W.PET/CERES
100 CREAM (GRAM) TOPICAL DAILY
Status: DISCONTINUED | OUTPATIENT
Start: 2019-04-25 | End: 2019-04-27 | Stop reason: HOSPADM

## 2019-04-25 RX ORDER — SODIUM CHLORIDE, SODIUM LACTATE, POTASSIUM CHLORIDE, CALCIUM CHLORIDE 600; 310; 30; 20 MG/100ML; MG/100ML; MG/100ML; MG/100ML
1000 INJECTION, SOLUTION INTRAVENOUS CONTINUOUS
Status: DISCONTINUED | OUTPATIENT
Start: 2019-04-25 | End: 2019-04-25

## 2019-04-25 RX ORDER — POTASSIUM CHLORIDE 1500 MG/1
40 TABLET, EXTENDED RELEASE ORAL ONCE
Status: COMPLETED | OUTPATIENT
Start: 2019-04-25 | End: 2019-04-25

## 2019-04-25 RX ORDER — POTASSIUM CHLORIDE 29.8 MG/ML
20 INJECTION INTRAVENOUS
Status: DISCONTINUED | OUTPATIENT
Start: 2019-04-25 | End: 2019-04-27 | Stop reason: HOSPADM

## 2019-04-25 RX ORDER — DIAZEPAM 10 MG/2ML
5-10 INJECTION, SOLUTION INTRAMUSCULAR; INTRAVENOUS EVERY 30 MIN PRN
Status: DISCONTINUED | OUTPATIENT
Start: 2019-04-25 | End: 2019-04-27 | Stop reason: HOSPADM

## 2019-04-25 RX ORDER — MULTIPLE VITAMINS W/ MINERALS TAB 9MG-400MCG
1 TAB ORAL ONCE
Status: COMPLETED | OUTPATIENT
Start: 2019-04-25 | End: 2019-04-25

## 2019-04-25 RX ORDER — LORAZEPAM 2 MG/ML
1 INJECTION INTRAMUSCULAR
Status: DISCONTINUED | OUTPATIENT
Start: 2019-04-25 | End: 2019-04-27 | Stop reason: HOSPADM

## 2019-04-25 RX ORDER — AMOXICILLIN 250 MG
1 CAPSULE ORAL 2 TIMES DAILY PRN
Status: DISCONTINUED | OUTPATIENT
Start: 2019-04-25 | End: 2019-04-27 | Stop reason: HOSPADM

## 2019-04-25 RX ORDER — CALCIUM CARBONATE 500 MG/1
1000 TABLET, CHEWABLE ORAL EVERY 4 HOURS PRN
Status: DISCONTINUED | OUTPATIENT
Start: 2019-04-25 | End: 2019-04-27 | Stop reason: HOSPADM

## 2019-04-25 RX ORDER — POTASSIUM CHLORIDE 1.5 G/1.58G
20-40 POWDER, FOR SOLUTION ORAL
Status: DISCONTINUED | OUTPATIENT
Start: 2019-04-25 | End: 2019-04-27 | Stop reason: HOSPADM

## 2019-04-25 RX ORDER — QUETIAPINE FUMARATE 25 MG/1
25-50 TABLET, FILM COATED ORAL EVERY 6 HOURS PRN
Status: DISCONTINUED | OUTPATIENT
Start: 2019-04-25 | End: 2019-04-27 | Stop reason: HOSPADM

## 2019-04-25 RX ADMIN — MULTIPLE VITAMINS W/ MINERALS TAB 1 TABLET: TAB at 01:02

## 2019-04-25 RX ADMIN — Medication 100 MG: at 01:02

## 2019-04-25 RX ADMIN — FOLIC ACID 1 MG: 1 TABLET ORAL at 08:48

## 2019-04-25 RX ADMIN — ESCITALOPRAM OXALATE 10 MG: 10 TABLET ORAL at 16:33

## 2019-04-25 RX ADMIN — LORAZEPAM 1 MG: 2 INJECTION INTRAMUSCULAR; INTRAVENOUS at 00:59

## 2019-04-25 RX ADMIN — MULTIPLE VITAMINS W/ MINERALS TAB 1 TABLET: TAB at 08:48

## 2019-04-25 RX ADMIN — POTASSIUM CHLORIDE 40 MEQ: 1500 TABLET, EXTENDED RELEASE ORAL at 01:19

## 2019-04-25 RX ADMIN — CALCIUM CARBONATE (ANTACID) CHEW TAB 500 MG 1000 MG: 500 CHEW TAB at 09:32

## 2019-04-25 RX ADMIN — QUETIAPINE 50 MG: 25 TABLET ORAL at 21:40

## 2019-04-25 RX ADMIN — SODIUM CHLORIDE, POTASSIUM CHLORIDE, SODIUM LACTATE AND CALCIUM CHLORIDE: 600; 310; 30; 20 INJECTION, SOLUTION INTRAVENOUS at 09:32

## 2019-04-25 RX ADMIN — LORAZEPAM 1 MG: 2 INJECTION INTRAMUSCULAR; INTRAVENOUS at 01:59

## 2019-04-25 RX ADMIN — QUETIAPINE 25 MG: 25 TABLET ORAL at 14:43

## 2019-04-25 RX ADMIN — SODIUM CHLORIDE, POTASSIUM CHLORIDE, SODIUM LACTATE AND CALCIUM CHLORIDE: 600; 310; 30; 20 INJECTION, SOLUTION INTRAVENOUS at 17:31

## 2019-04-25 RX ADMIN — SODIUM CHLORIDE, POTASSIUM CHLORIDE, SODIUM LACTATE AND CALCIUM CHLORIDE 1000 ML: 600; 310; 30; 20 INJECTION, SOLUTION INTRAVENOUS at 01:01

## 2019-04-25 RX ADMIN — DIAZEPAM 10 MG: 5 TABLET ORAL at 08:48

## 2019-04-25 RX ADMIN — Medication 100 MG: at 08:48

## 2019-04-25 RX ADMIN — DIAZEPAM 10 MG: 5 TABLET ORAL at 16:42

## 2019-04-25 RX ADMIN — FOLIC ACID 1 MG: 1 TABLET ORAL at 01:02

## 2019-04-25 RX ADMIN — SODIUM CHLORIDE, POTASSIUM CHLORIDE, SODIUM LACTATE AND CALCIUM CHLORIDE 1000 ML: 600; 310; 30; 20 INJECTION, SOLUTION INTRAVENOUS at 01:54

## 2019-04-25 ASSESSMENT — ACTIVITIES OF DAILY LIVING (ADL)
ADLS_ACUITY_SCORE: 12

## 2019-04-25 ASSESSMENT — MIFFLIN-ST. JEOR: SCORE: 1832.1

## 2019-04-25 ASSESSMENT — ENCOUNTER SYMPTOMS: BLOOD IN STOOL: 0

## 2019-04-25 NOTE — PROGRESS NOTES
RECEIVING UNIT ED HANDOFF REVIEW    ED Nurse Handoff Report was reviewed by: Madeline Matthews on April 25, 2019 at 2:22 AM

## 2019-04-25 NOTE — PROGRESS NOTES
Admission    Patient arrives to room 622-2 via cart from ED.      Inpatient nursing criteria listed below were met:    PCD's Documented: Yes  Skin issues/needs documented :NA  Isolation education started/completed NA  Patient allergies verified with patient: NA  Verified completion of [place] Risk Assessment Tool:  Yes  Verified completion of Guardianship screening tool: Yes  Fall Prevention: Care plan updated, Education given and documented Yes  Care Plan initiated: Yes  Home medications documented in belongings flowsheet: NA  Patient belongings documented in belongings flowsheet: Yes  Reminder note (belongings/ medications) placed in discharge instructions:Yes  Admission profile/ required documentation complete: Yes

## 2019-04-25 NOTE — ED PROVIDER NOTES
"  History     Chief Complaint:  Withdrawal       HPI   Jelani Mcclain Jr. is a 34 year old male with a history of alcohol abuse and alcohol withdrawal, no history of alcohol withdrawal seizures who presents with alcohol withdrawal symptoms. The patient reports that he is a daily alcohol drinker, reports that he drinking 2 pints of vodka a day. He states that he wants to quit but is not able to comply with outpatient treatment due to the severity of his withdrawal symptoms. Patient wants to check himself in to an inpatient treatment facility after being medically supervised through his alcohol withdrawal. He states that he already went through withdrawal earlier this week but drank about 8 hours ago because his symptoms were too much to handle. The patient denies recent stressors, suicidal ideations, or black or bloody stool.       Allergies:  No known drug allergies     Medications:    Propranolol   Prozac  Atarax    Past Medical History:    Adjustment disorder with anxious mood   Anxiety   Esophagitis   OCD (obsessive compulsive disorder)   Panic attack    Past Surgical History:    History reviewed. No pertinent surgical history.    Family History:    History reviewed. No pertinent family history.     Social History:  Smoking status: Never smoker  Alcohol use: Yes  Marital Status:  Single [1]       Review of Systems   Gastrointestinal: Negative for blood in stool.   Psychiatric/Behavioral: Negative for suicidal ideas.   All other systems reviewed and are negative.      Physical Exam     Patient Vitals for the past 24 hrs:   BP Temp Temp src Heart Rate Resp SpO2 Height Weight   04/25/19 0300 (!) 133/98 97.8  F (36.6  C) -- 77 18 98 % -- --   04/25/19 0059 (!) 150/113 98.7  F (37.1  C) Temporal 85 17 99 % 1.905 m (6' 3\") 81.6 kg (180 lb)     Physical Exam  General: Well-nourished, tearful and very anxious   Eyes: PERRL, conjunctivae pink no scleral icterus or conjunctival injection  ENT:  Moist mucus membranes, " posterior oropharynx clear without erythema or exudates  Respiratory:  Lungs clear to auscultation bilaterally, no crackles/rubs/wheezes.  Good air movement  CV: Normal rate and rhythm, no murmurs/rubs/gallops  GI:  Abdomen soft and non-distended.  Normoactive BS.  No tenderness, guarding or rebound  Skin: Warm, dry.  No rashes or petechiae  Musculoskeletal: No peripheral edema or calf tenderness  Neuro: Alert and oriented to person/place/time.  Mildly tremulous, no tongue fasciculations  Psychiatric: Tearful anxious affect      Emergency Department Course   ECG (00:52:33):  Rate 65 bpm. IA interval 178. QRS duration 112. QT/QTc 380/395. P-R-T axes 61 75 63. Sinus rhythm with premature atrial complexes, Otherwise normal ECG,  Interpreted at 0106 by Kenisha Craft MD.    Laboratory:  Magnesium: 2.5  Lipase: 166  Alcohol ethyl: 0.26  INR: 0.92  CMP: Potassium 3.2, Glucose 123, Protein total 8.9, (Creatinine 1.05)  CBC: WNL (WBC 6.3, HGB 15.7, )    Interventions:  0119: Potassium chloride 40 mEq PO  0102: Vitamin B1 100 mg PO  0102: Folic acid 1 mg PO  0102: Multivitamin w/minerals 1 tablet  0153: LR bolus 866 mg IV  0159: Ativan 1 mg IV    Emergency Department Course:  Past medical records, nursing notes, and vitals reviewed.  0039: I performed an exam of the patient and obtained history, as documented above.    IV inserted and blood drawn.    Findings and plan explained to the Patient who consents to admission.     0138: Discussed the patient with Dr. Robertson, who will admit the patient to a inpatient Suds bed for further monitoring, evaluation, and treatment.     0220: I rechecked the patient. Explained findings to the patient.      Impression & Plan      Medical Decision Making:  Jelani Mcclain Jr. is a 34 year old male who presents for evaluation of symptoms of alcohol withdrawal.  He is still intoxicated here in ED by blood work but with signs and symptoms of developing alcohol withdrawal.   There is no  evidence of significant liver impairment or acute alcoholic hepatitis.  Patient has a history of withdrawals but no history of seizures./There are no signs of co-ingestion including acetaminophen, drugs, medications, volatile alcohols. He has no signs of trauma related to alcohol use and no further workup is needed including head CT.  Given that he is likely to undergo significant withdrawal given his history and his current symptoms, we will admit him to the hospital for medically supervised withdrawal.  Dr. Robertson graciously agreed to admit him to our SUDS unit.  The patient was appreciative of and in agreement with this plan.    Diagnosis:    ICD-10-CM    1. Alcohol withdrawal syndrome with complication (H) F10.239    2. Hypokalemia E87.6        Disposition:  Admitted to inpatient Suds bed      Denny Avalos  4/25/2019    EMERGENCY DEPARTMENT    Scribe Disclosure:  Denny STILES, am serving as a scribe at 12:39 AM on 4/25/2019 to document services personally performed by Kenisha Craft MD based on my observations and the provider's statements to me.          Kenisha Craft MD  04/25/19 2382

## 2019-04-25 NOTE — CONSULTS
"Consult Date:  04/25/2019      PSYCHIATRIC CONSULTATION      REASON FOR CONSULTATION:  Alcohol dependence and withdrawal, comorbid anxiety and OCD.      REQUESTING PHYSICIAN:  Mitch Robertson MD      PRIMARY CARE COMPLAINT:  None given.      OUTPATIENT PSYCHIATRIST:  [facility].      IDENTIFYING DATA:  Jelani Mcclain Jr. is a 34-year-old man who reports he has been in a long-term relationship with his girlfriend of 10 years who has a 10-year-old son.  He has no children of his own.  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  He self-presented to [facility] ED for assistance with alcohol dependence and withdrawal while in outpatient CD treatment.  Psychiatry was consulted to render an opinion on his alcohol use disorder and anxiety.  Information was gathered through direct patient contact as well as chart review.      CHIEF COMPLAINT:  \"My anxiety is just out of control.\"      HISTORY OF PRESENT ILLNESS:  Jelani Mcclain Jr. reports an established history of alcohol use disorder and depression.  He reports that he had missed several appointments at [facility], on account of which he was asked to reschedule an intake, which is yet to do.  He states he has been getting refills for his prescribed fluoxetine through [facility].  He reports intermittent sobriety from alcohol and states he was last at residential treatment at [facility] in [place] last August.  He states he was able to stay sober for about 2 months, following which he gradually began to dabble into alcohol use again.  He states he had lost his job in 02/2018 on account of his alcohol use and tardiness, on account of which he sought help.  He tells me that he is in a very good position at this time in terms of work, but he has gradually escalated his use of alcohol, to the point of consuming 2 pints of vodka a day.  He states he buys them a pint at a time and usually " "consumes a pint within 2 hours of buying it.  He states by the end of the day, he has consumed 2 pints, but claims he is able to maintain his role at work, where he attends meetings and does not necessarily appear impaired to others.  However, he tells me that he is very forgetful and paranoid.  He states he forgets when things have happened as well as things he is supposed to do, and he is afraid that this will ultimately become a bigger problem and cause him to lose his job again.  He denies any history of alcohol withdrawal seizures or blackouts.  He states he never really recognized the symptoms of alcohol withdrawal until this time.  He tells me he wants to quit using and is open to pursuing residential treatment since he is currently in outpatient treatment and continues to drink.      With respect to depression symptom profile, the patient endorsed depressed mood in the context of his continued alcohol use and his inability to control his thoughts.  He reports that he dwells on things a lot and obsesses about being convinced that everything is directed at him.  He states he feels that people can hear his thoughts and has a feeling that he is never alone, \"as if there is a presence around me.\"  He states he also repeats songs in his head until he feels that they are perfect and this never happens, to the point that he feels very frustrated.  He states his sister suffers from a similar condition and takes Lexapro and CBD oil.  The patient also reports that he is currently estranged from his girlfriend and although he has a place in [facility] that he pays for, he is currently staying and an Airbnb in [place].  He states that with his significant alcohol use, he has been experiencing severe nausea and sweating, to the point that he cannot think straight.  He states he experiences too much pain and cannot eat and is only able to keep water down.  He does not endorse history consistent with PTSD or agoraphobia.  He " reports future orientation.  He denies symptoms suggestive of kermit and denies illicit drug use.      PAST PSYCHIATRIC HISTORY:  The patient denies previous psychiatric hospitalization, but had been managed at [facility] for major depressive disorder and OCD.  He states he has only tried Prozac at 20 mg, but he has been inconsistent in his compliance.      CHEMICAL USE HISTORY:  The patient reports he began using alcohol as a teenager and has had a few outpatient treatments apart from the residential treatment at [facility] the past year.      PAST MEDICAL HISTORY:  The patient reports being in good physical health, but reports history of esophagitis.      PAST SURGICAL HISTORY:  None reported.      FAMILY PSYCHIATRIC HISTORY:  OCD in his sister, alcohol use problems in his maternal grandparents and paternal grandfather.      ALLERGIES:  NO KNOWN DRUG ALLERGIES.      MEDICATIONS PRIOR TO ADMISSION:  Prozac 20 mg daily, hydroxyzine 25 mg q.6 hours p.r.n.      SOCIAL HISTORY:  The patient reports he grew up in [place].  He has 1 older sister and brother.  He denies history of physical, emotional and sexual abuse.  He states his parents support him as the youngest child.  He went through school and did not have any problems until high school when he began to act out.  He reportedly was kicked out of school on account of making terroristic threats.  He subsequently did not complete high school, but started working in the IT field.  He tells me he is a self-taught .  He has never been .  He is in a long-term relationship with his girlfriend, from whom he is currently estranged.  He denies  or criminal history.      REVIEW OF SYSTEMS:  I refer the reader to the 10-point review of systems documented by Mitch Robertson MD on 04/25/2019 at 2:29 a.m.      VITAL SIGNS:  Blood pressure 156/101, pulse 84, respirations 18, temperature 98.8, weight 81.6 kg.      MENTAL STATUS EXAMINATION:  This is a  middle-aged man who appears his stated age of 34.  He is dressed in hospital gown and is tremulous.  He makes fair eye contact.  He is currently receiving IV fluids.  His speech is clear.  He averts eye contact.  His thought process is mostly logical, but he is expressing paranoia and delusions of control as well as obsessive preoccupations.  He denies active self-harm thoughts, plan, or intent and does not endorse any homicidal thoughts or plans.  He is future oriented.  His gait and station are not assessed, as he is currently bed bound.  His muscle strength is adequate.  His associations are tight.  His language is appropriate.  Risk assessment at this time is considered moderate.  He displays fair insight and judgment.      DIAGNOSTIC IMPRESSION:  Jelani Mcclain . is a 34-year-old single father of none with established history of alcohol use disorder, major depressive disorder, generalized anxiety disorder and obsessive-compulsive disorder, who presented to the hospital on account of alcohol withdrawal symptoms in the context of consuming 2 pints of vodka daily.  He is seeking help for his alcohol use disorder and comorbid mental health issues.      DIAGNOSES:   1.  Alcohol use disorder with alcohol withdrawal.   2.  Major depressive disorder, recurrent, severe with psychotic features.   3.  Mixed obsessional thoughts.      RECOMMENDATIONS:   1.  Medical management as you are.   2.  The patient will benefit from chemical use assessment and ultimate transfer to inpatient CD treatment.   3.  The patient reports symptoms of depression as well as delusional thinking and mixed obsessional thoughts.  He will benefit from commencement of Lexapro at 10 mg daily given the fact that his sister is doing well on this medication in addition to using quetiapine at 50 mg at bedtime and 25-50 mg q.6 hours p.r.n.  Psychiatry should be reconsulted to re-evaluate him tomorrow.      Thanks for the consult.         FREDERICK BHATIA  MD NELLY             D: 2019   T: 2019   MT: CLAU      Name:     FATEMEH NEUMANN   MRN:      [id number]        Account:       [id number]   :      1985           Consult Date:  2019      Document: [id number]

## 2019-04-25 NOTE — ED NOTES
"[facility]  ED Nurse Handoff Report    ED Chief complaint: Withdrawal (going thru withdrawl. last drink 12 hours ago. No Hx of seizures. Drinks 2 pints per day)      ED Diagnosis:   Final diagnoses:   Alcohol withdrawal syndrome with complication (H)   Hypokalemia       Code Status: Full Code    Allergies: No Known Allergies    Activity level - Baseline/Home:  Independent    Activity Level - Current:   Independent     Needed?: No    Isolation: No  Infection: Not Applicable  Bariatric?: No    Vital Signs:   Vitals:    04/25/19 0059   BP: (!) 150/113   Resp: 17   Temp: 98.7  F (37.1  C)   TempSrc: Temporal   SpO2: 99%   Weight: 81.6 kg (180 lb)   Height: 1.905 m (6' 3\")       Cardiac Rhythm: ,        Pain level:      Is this patient confused?: No   Does this patient have a guardian?  No         If yes, is there guardianship documents in the Epic \"Code/ACP\" activity?  N/A         Guardian Notified?  N/A  [place] - Suicide Severity Rating Scale Completed?  Yes  If yes, what color did the patient score?  White    Patient Report: Initial Complaint: 34 year old pt presents to the ED tearful and shaking. He reports his last drink was about 12 hours ago and that he needed help quitting. Pt reports drinking 2 pints of vodka a day.  Focused Assessment: see above  Tests Performed: labs  Abnormal Results:   Abnormal Labs Reviewed   ALCOHOL ETHYL - Abnormal; Notable for the following components:       Result Value    Ethanol g/dL 0.26 (*)     All other components within normal limits   COMPREHENSIVE METABOLIC PANEL - Abnormal; Notable for the following components:    Potassium 3.2 (*)     Glucose 123 (*)     Protein Total 8.9 (*)     All other components within normal limits   MAGNESIUM - Abnormal; Notable for the following components:    Magnesium 2.5 (*)     All other components within normal limits     Treatments provided: 2 L LR, ativan 1 mg, folic acid 1 mg, multivitamin 1 tab, potassium chloride " 40 mEq po tabs, vitamin B1    Family Comments: none    OBS brochure/video discussed/provided to patient/family: No              Name of person given brochure if not patient: na              Relationship to patient: na    ED Medications:   Medications   lactated ringers BOLUS 1,000 mL (1,000 mLs Intravenous New Bag 4/25/19 0101)     Followed by   lactated ringers infusion (has no administration in time range)   LORazepam (ATIVAN) injection 1 mg (1 mg Intravenous Given 4/25/19 0059)   vitamin B1 (THIAMINE) tablet 100 mg (100 mg Oral Given 4/25/19 0102)   folic acid (FOLVITE) tablet 1 mg (1 mg Oral Given 4/25/19 0102)   multivitamin w/minerals (THERA-VIT-M) tablet 1 tablet (1 tablet Oral Given 4/25/19 0102)   potassium chloride ER (K-DUR/KLOR-CON M) CR tablet 40 mEq (40 mEq Oral Given 4/25/19 0119)       Drips infusing?:  Yes    For the majority of the shift this patient was Green.   Interventions performed were none.    Severe Sepsis OR Septic Shock Diagnosis Present: No    To be done/followed up on inpatient unit:  nothing noted at this time    ED NURSE PHONE NUMBER: [phone]

## 2019-04-25 NOTE — PROGRESS NOTES
Non billing note: 35 y/o male with PMH of Alcoohol dependence, Anxiety/depression admitted early morning by Dr Robertson after he presented to ER with withdrawal symptoms; he is a daily alcohol drinker, reports that he drinking 2 pints of vodka a day but states that he wants to quit and wants to go to inpatient treatment facility after detox; reports feeling better but still tremulous and anxious; reports mild epigastric discomfort but no vomiting since admission; continue with iv fluids, CIWA, vitamins; Psych consulted- started him on Lexapro 10 mg po daily and Seroquel 50 mg at bedtime amd 25-50 mg po q6h prn for anxiety; will reconsult Psychj in am to reasses the patient; CD consult; added prilosec.    Betty Srinivasan MD

## 2019-04-25 NOTE — PHARMACY-ADMISSION MEDICATION HISTORY
Admission medication history interview status for the 4/25/2019  admission is complete. See EPIC admission navigator for prior to admission medications     Medication history source reliability:Good    Actions taken by pharmacist (provider contacted, etc):None     Additional medication history information not noted on PTA med list : Pt reports he is supposed to be taking Fluoxetine, but has not been taking for a while.    Medication reconciliation/reorder completed by provider prior to medication history? No    Time spent in this activity: 5 minutes    Prior to Admission medications    Medication Sig Last Dose Taking? Auth Provider   FLUoxetine (PROZAC) 20 MG capsule 10 mg first week then increase to 20 mg daily Unknown at Unknown time  Martha Ambrosio MD

## 2019-04-25 NOTE — H&P
[facility]    History and Physical - Hospitalist Service       Date of Admission:  4/25/2019    Assessment & Plan   Jelani Mcclain Jr. is a 34 year-old male admitted on 4/25/2019.     Alcohol dependence with acute alcohol withdrawal, uncomplicated  Presents requesting assistance with alcoholism, associated with tremors, nausea/vomiting. Reports drinking 2 pints of vodka daily. Ethanol level 0.26 g/dL at ~1 AM on admission despite last drink reported during the afternoon. Expressing interest in quitting. Has had previous inpatient treatment in 8/2018, most recently in outpatient treatment though with two admissions to the hospital for relapse since starting.   - CIWA protocol with diazepam   - Psychiatry and chemical dependency consulted  - Folate, thiamine, MVI  - IVF    Hypokalemia  Magnesium high.   - Monitor and replace per protocol     Obsessive compulsive disorder  Anxiety with panic attacks  Co-morbid with alcohol use. Reports episodes of hyperventilation, chest pain and numbness in hands consistent with panic attacks.  - Psychiatry consulted as above    Hemorrhoids  Notes occasional bright red blood on wipe and in toilet water, present for many years with known hemorrhoids.   - Hemoglobin normal  - Monitor     Nausea and vomiting  Likely secondary to alcohol use (possible alcoholic gastritis) and withdrawal. Reports some diffuse abdominal soreness and tenderness on exam which is likely secondary to muscular soreness from prior vomiting, LFTs and lipase normal.    - Anti-emetics PRN  - Needs complete alcohol cessation        Diet: Clear liquid diet, advance to regular as tolerated  DVT Prophylaxis: Ambulate every shift  [place] Catheter: not present  Code Status: Full code      Disposition Plan   Expected discharge:  Admit to inpatient. Anticipate greater than or equal to 2 midnights prior to discharge for treatment and resolution of withdrawal.   Entered: Mitch Robertson MD 04/25/2019, 2:29 AM      The patient's care was discussed with the Patient.    Mitch Robertson MD  [facility]    ______________________________________________________________________    Chief Complaint   Alcohol dependence     History is obtained from the patient    History of Present Illness   Jelani Mcclain Jr. is a 34 year-old male who presents with the above chief complaint.    The patient reports he has recently been drinking 2 pints of vodka daily despite participating in outpatient alcohol treatment.  He finally decided that he could no longer continue drinking like he has and needed help which led to him seeking care.  His last drink was the afternoon of 4/24, he is somewhat foggy on the time.  Since then he has started to develop tremors.  He has also been having for the past few days intermittent nausea and episodes of nonbloody vomiting.  He was able to tolerate a chicken burrito 4/24. Since vomiting he has had generalized abdominal muscle soreness. He has had episodes of hyperventilation, tingling in his hands and chest pain associated with anxiety and panic.  He has occasional blood on the wipe and bright red blood in toilet water present for years with known history of hemorrhoids. No dark or tarry stools. He has been previously diagnosed with anxiety and obsessive-compulsive disorder, previously followed with a psychiatrist though has not seen one recently.    In the Emergency Department, the patient was seen by Dr. Kenisha Craft, with whom I discussed the patient's presenting symptoms and emergency department course.  Initial vital signs were a temperature 98.7F, HR 85, /113, RR 17, SpO2 99% on room air. Work-up included normal CBC, INR, LFTs, lipase. Ethanol level 0.26. Potassium 3.2. Hospitalists were contacted for admission to the hospital.     Review of Systems    Complete 10 point review of systems assessed and negative except as noted in HPI.    Past Medical History    I have reviewed this patient's  medical history and updated it with pertinent information if needed.   Past Medical History:   Diagnosis Date     Anxiety      Esophagitis     seenin Er fo rCP in [facility] and work up neg, had scope showing chemical esophagitis     OCD (obsessive compulsive disorder) 8/30/2016     Panic attack 8/30/2016       Past Surgical History   I have reviewed this patient's surgical history and updated it with pertinent information if needed.  No past surgical history on file.      Social History   Never smoker. Using alcohol since age 10. Longest period of sobriety recently was during time at inpatient rehab in 8/2018. Most recently drinking two pints of vodka daily. No illicit or IV drug use    Works as an .    Family History   Half-brother has colon cancer. Father has COPD associated with smoking.     Prior to Admission Medications   Prior to Admission Medications   Prescriptions Last Dose Informant Patient Reported? Taking?   FLUoxetine (PROZAC) 20 MG capsule   No No   Sig: 10 mg first week then increase to 20 mg daily   hydrOXYzine (ATARAX) 25 MG tablet   No No   Sig: Take 1 tablet (25 mg) by mouth every 6 hours as needed for itching      Facility-Administered Medications: None     Allergies   No Known Allergies    Physical Exam   Vital Signs: Temp: 98.7  F (37.1  C) Temp src: Temporal BP: (!) 150/113   Heart Rate: 85 Resp: 17 SpO2: 99 % O2 Device: None (Room air)    Weight: 180 lbs 0 oz    Constitutional: NAD  Eyes: PERRL, EOMI  HENT: Oropharynx clear, MMM  Respiratory: Clear to auscultation bilaterally, good air movement   Cardiovascular: RRR, no m/r/g. No peripheral edema.    GI: Soft, mild diffuse discomfort with palpation without rebound tenderness or guarding. Non-distended. BS normoactive.  Skin: Warm, dry   Neurologic: Alert. Responding to questions appropriately. Following commands.    Psychiatric: Normal affect, appropriate      Data   Data reviewed today: I reviewed all medications, new labs and imaging  results over the last 24 hours. I personally reviewed the EKG tracing showing sinus rhythm with PACs.    Recent Labs   Lab 04/25/19  0043   WBC 6.3   HGB 15.7   MCV 86      INR 0.92      POTASSIUM 3.2*   CHLORIDE 106   CO2 28   BUN 14   CR 1.05   ANIONGAP 8   KIMMY 9.0   *   ALBUMIN 4.2   PROTTOTAL 8.9*   BILITOTAL 0.4   ALKPHOS 85   ALT 44   AST 36   LIPASE 166       No results found for this or any previous visit (from the past 24 hour(s)).

## 2019-04-25 NOTE — PLAN OF CARE
DATE & TIME: 4/25, 0530  ORIENTATION: a/o x4  BEHAVIOR & AGGRESSION TOOL COLOR: yellow, noncompliant  CIWA SCORE: 3  ABNL VS/O2: vss on ra  MOBILITY:up sba, unsteady gait  PAIN MANAGMENT: no c/o pain  DIET: regular  BOWEL/BLADDER: continent  ABNL LAB/BG: pastora 0.29  DRAIN/DEVICES: piv sl  TELEMETRY RHYTHM: na  SKIN:wdl  TESTS/PROCEDURES: na  D/C DAY/GOALS/PLACE:  OTHER IMPORTANT INFO: pt has been non compliant w bed alarm, turning it off himself despite unsteady gait. Educated on importance of bed alarm. Pt also was found down hallway without iv pole, pt had unscrewed fluids from iv. Educated on importance of only nurse handling iv.

## 2019-04-25 NOTE — PLAN OF CARE
DATE & TIME: 4/25/2019 7123-0821  ORIENTATION: A&O x4.   BEHAVIOR & AGGRESSION TOOL COLOR: green, calm and pleasant   CIWA SCORE: 8/4/5, for anxiety, tremor and HA. Valium 10 mg oral given once. Has anxiety at baseline prior to admission.   ABNL VS/O2: /103, 140/104, currently going through withdrawal. Will monitor. ON RA. Lungs clear.   MOBILITY: SBA.   PAIN MANAGMENT: Tums given once for abd pain due to acid, effective  DIET: tolerated clear, upgraded to Full, ok to discontinue IVF once tolerating diet.   BOWEL/BLADDER: continent.   ABNL LAB/BG: Recheck K 3.9 after oral replacement once in ED  DRAIN/DEVICES: PIV on right arm.   TELEMETRY RHYTHM: na   SKIN: tattoo on arms  TESTS/PROCEDURES:  na   D/C DAY/GOALS/PLACE: pending withdrawal symptoms and psychiatrist recommendation.   OTHER IMPORTANT INFO:

## 2019-04-26 LAB
ANION GAP SERPL CALCULATED.3IONS-SCNC: 5 MMOL/L (ref 3–14)
BUN SERPL-MCNC: 10 MG/DL (ref 7–30)
CALCIUM SERPL-MCNC: 8.9 MG/DL (ref 8.5–10.1)
CHLORIDE SERPL-SCNC: 105 MMOL/L (ref 94–109)
CO2 SERPL-SCNC: 28 MMOL/L (ref 20–32)
CREAT SERPL-MCNC: 1 MG/DL (ref 0.66–1.25)
GFR SERPL CREATININE-BSD FRML MDRD: >90 ML/MIN/{1.73_M2}
GLUCOSE SERPL-MCNC: 90 MG/DL (ref 70–99)
POTASSIUM SERPL-SCNC: 3.9 MMOL/L (ref 3.4–5.3)
SODIUM SERPL-SCNC: 138 MMOL/L (ref 133–144)

## 2019-04-26 PROCEDURE — 36415 COLL VENOUS BLD VENIPUNCTURE: CPT | Performed by: INTERNAL MEDICINE

## 2019-04-26 PROCEDURE — 25000132 ZZH RX MED GY IP 250 OP 250 PS 637: Performed by: INTERNAL MEDICINE

## 2019-04-26 PROCEDURE — 25000132 ZZH RX MED GY IP 250 OP 250 PS 637: Performed by: PSYCHIATRY & NEUROLOGY

## 2019-04-26 PROCEDURE — 99232 SBSQ HOSP IP/OBS MODERATE 35: CPT | Performed by: PSYCHIATRY & NEUROLOGY

## 2019-04-26 PROCEDURE — 12000000 ZZH R&B MED SURG/OB

## 2019-04-26 PROCEDURE — 99232 SBSQ HOSP IP/OBS MODERATE 35: CPT | Performed by: INTERNAL MEDICINE

## 2019-04-26 PROCEDURE — 80048 BASIC METABOLIC PNL TOTAL CA: CPT | Performed by: INTERNAL MEDICINE

## 2019-04-26 RX ORDER — PROPRANOLOL HYDROCHLORIDE 10 MG/1
10 TABLET ORAL 3 TIMES DAILY
Status: DISCONTINUED | OUTPATIENT
Start: 2019-04-26 | End: 2019-04-27 | Stop reason: HOSPADM

## 2019-04-26 RX ADMIN — QUETIAPINE 25 MG: 25 TABLET ORAL at 12:43

## 2019-04-26 RX ADMIN — PROPRANOLOL HYDROCHLORIDE 10 MG: 10 TABLET ORAL at 18:06

## 2019-04-26 RX ADMIN — MULTIPLE VITAMINS W/ MINERALS TAB 1 TABLET: TAB at 08:48

## 2019-04-26 RX ADMIN — PROPRANOLOL HYDROCHLORIDE 10 MG: 10 TABLET ORAL at 22:00

## 2019-04-26 RX ADMIN — FOLIC ACID 1 MG: 1 TABLET ORAL at 08:48

## 2019-04-26 RX ADMIN — OMEPRAZOLE 20 MG: 20 CAPSULE, DELAYED RELEASE ORAL at 08:39

## 2019-04-26 RX ADMIN — Medication 100 MG: at 08:48

## 2019-04-26 RX ADMIN — QUETIAPINE 50 MG: 25 TABLET ORAL at 18:42

## 2019-04-26 RX ADMIN — QUETIAPINE FUMARATE 50 MG: 50 TABLET ORAL at 22:00

## 2019-04-26 RX ADMIN — ESCITALOPRAM OXALATE 10 MG: 10 TABLET ORAL at 08:39

## 2019-04-26 ASSESSMENT — ACTIVITIES OF DAILY LIVING (ADL)
ADLS_ACUITY_SCORE: 12
ADLS_ACUITY_SCORE: 10
ADLS_ACUITY_SCORE: 11
ADLS_ACUITY_SCORE: 12

## 2019-04-26 NOTE — PLAN OF CARE
DATE & TIME: 4/26/2019 8774-7265  ORIENTATION: A&O x4.   BEHAVIOR & AGGRESSION TOOL COLOR: green  CIWA SCORE: 0/0  ABNL VS/O2: vss, on RA  MOBILITY: IND  PAIN MANAGMENT: denied pain  DIET: tolerating regular   BOWEL/BLADDER: continent  ABNL LAB/BG: NA   DRAIN/DEVICES: Right arm PIV, SL  TELEMETRY RHYTHM: na   SKIN: intact, except for tattoo on arms and legs   TESTS/PROCEDURES:  D/C DAY/GOALS/PLACE:  OTHER IMPORTANT INFO:

## 2019-04-26 NOTE — CONSULTS
[facility] Psychiatric Consult Progress Note    Interval History:   Pt seen, chart reviewed, case discussed with nursing staff and treating clinicians. Has initiated Lexapro and utilized a PRN 25 Seroquel and 50 mg in the evening. He's aware the timleine of antidepressant tx is on the order of weeks, but he's tolerating the meds well. Slept a lot yesterday. Gradually feeling physically better. Planning on following up with CD and psych tx resources at [facility]. No safety concerns.     Review of systems:   10 point Review of Systems completed by Agapito Dao DO, and is  is negative other than noted in the HPI     Medications:       escitalopram  10 mg Oral Daily     folic acid  1 mg Oral Daily     influenza vaccine adult (product based on age)  0.5 mL Intramuscular Prior to discharge     multivitamin w/minerals  1 tablet Oral Daily     omeprazole  20 mg Oral QAM AC     QUEtiapine  50 mg Oral At Bedtime     vitamin B1  100 mg Oral Daily     acetaminophen, acetaminophen, bisacodyl, calcium carbonate, diazepam **OR** diazepam, LORazepam, magnesium sulfate, melatonin, naloxone, ondansetron **OR** ondansetron, polyethylene glycol, potassium chloride, potassium chloride with lidocaine, potassium chloride, potassium chloride, potassium chloride, prochlorperazine **OR** prochlorperazine **OR** prochlorperazine, QUEtiapine, senna-docusate **OR** senna-docusate    Mental Status Examination:     Appearance:  awake, alert  Eye Contact:  good  Speech:  clear, coherent  Language:Normal  Psychomotor Behavior:  no evidence of tardive dyskinesia, dystonia, or tics  Mood:  Depressed, stable  Affect:  appropriate and in normal range  Thought Process:  logical, linear and goal oriented no loose associations  Thought Content:  no evidence of suicidal ideation or homicidal ideation and no evidence of psychotic thought  Oriented to:  time, person, and place  Attention Span and Concentration:  intact  Recent and Remote  Memory:  intact  Fund of Knowledge: appropriate  Muscle Strength and Tone: normal  Gait and Station: Normal  Insight:  good  Judgment:  intact         Labs/Vitals:     Recent Results (from the past 24 hour(s))   Basic metabolic panel    Collection Time: 04/26/19  7:37 AM   Result Value Ref Range    Sodium 138 133 - 144 mmol/L    Potassium 3.9 3.4 - 5.3 mmol/L    Chloride 105 94 - 109 mmol/L    Carbon Dioxide 28 20 - 32 mmol/L    Anion Gap 5 3 - 14 mmol/L    Glucose 90 70 - 99 mg/dL    Urea Nitrogen 10 7 - 30 mg/dL    Creatinine 1.00 0.66 - 1.25 mg/dL    GFR Estimate >90 >60 mL/min/[1.73_m2]    GFR Estimate If Black >90 >60 mL/min/[1.73_m2]    Calcium 8.9 8.5 - 10.1 mg/dL     B/P: 141/97, T: 97.5, P: 59, R: 18    Impression:   Jelani DAY Jr Sarahi. is a 34-year-old single father of none with established history of alcohol use disorder, major depressive disorder, generalized anxiety disorder and obsessive-compulsive disorder, who presented to the hospital on account of alcohol withdrawal symptoms in the context of consuming 2 pints of vodka daily.  He is seeking help for his alcohol use disorder and comorbid mental health issues.     4/26/19: Tolerating new meds. Won't make any changes today.        DIagnoses:   1.  Alcohol use disorder with alcohol withdrawal.   2.  Major depressive disorder, recurrent, severe with psychotic features.   3.  Mixed obsessional thoughts.            Plan:   1. Continue Lexapro 10 mg qd  2. Continue Seroquel 25-50 qid PRN and 50 mg qhs  3. Working on arranging f/u at [facility] ( tx, psychiatric services)     Attestation:  Patient has been seen and evaluated by me,  Agapito Dao DO

## 2019-04-26 NOTE — CONSULTS
Care Transition Initial Assessment - RN      Met with patient to discuss plan of care and discharge plans. Patient stating he is hallucinating and is aware of it. Discussed recommendation from psychiatry. Patient stating he was at [facility] in 2018 and missed 1 appointment. Patient requesting assistance with rescheduling his appointment.  Writer called [phone] .  Intake requesting patient call them personally to set up an appointment.

## 2019-04-26 NOTE — PROGRESS NOTES
[facility]    Hospitalist Progress Note      Assessment & Plan   PMH of Alcoohol dependence, Anxiety/depression admitted after he presented to ER with withdrawal symptoms    Alcohol dependence with acute alcohol withdrawal, uncomplicated  Presents requesting assistance with alcoholism, associated with tremors, nausea/vomiting. Reports drinking 2 pints of vodka daily. Ethanol level 0.26 g/dL at ~1 AM on admission despite last drink reported during the afternoon. Expressing interest in quitting. Has had previous inpatient treatment in 8/2018, most recently in outpatient treatment though with two admissions to the hospital for relapse since starting.   - CIWA protocol with diazepam   - Folate, thiamine, MVI; iv fluids stopped  - Psychiatry consult appreciated  - started him on Lexapro 10 mg po daily and Seroquel 50 mg at bedtime amd 25-50 mg po q6h prn for anxiety  - CD consult pending  - still feels anxious, said that Seroquel 25 mg dose did not help too much relieving anxiety; states that Propranolol helped him in the past with anxiety symptoms and tachycardia  - will start Propranolol 10 mg po TID;   - he plans to f/u at [facility] (CD tx, psychiatric services)      Hypokalemia  - Magnesium 2.5  - K replacement protocol     Obsessive compulsive disorder  Anxiety with panic attacks  Co-morbid with alcohol use. Reports episodes of hyperventilation, chest pain and numbness in hands consistent with panic attacks.  - Psychiatry consulted as above  - Lexapro 10 mg po daily and Seroquel 50 mg at bedtime amd 25-50 mg po q6h prn for anxiety     Nausea and vomiting- improved  - Likely secondary to alcohol use (possible alcoholic gastritis) and withdrawal.   - LFTs and lipase normal.    - started Omeprazole 20 mg po daily  - Anti-emetics PRN      DVT Prophylaxis: Ambulate every shift  Code Status: Full Code  Expected discharge: couple of days, recommended to prior living arrangement once clinically  improved.    Liyah Srinivasan MD    Interval History   Doing slightly better, still looks anxious and mildly tremulous, appetite improved, watery stools, no headache; discussed with RN    -Data reviewed today: I reviewed all new labs and imaging results over the last 24 hours. I personally reviewed no images or EKG's today.    Physical Exam   Temp: 97.5  F (36.4  C) Temp src: Oral BP: (!) 141/97 Pulse: 59 Heart Rate: 59 Resp: 18 SpO2: 94 % O2 Device: None (Room air)    Vitals:    04/25/19 0059   Weight: 81.6 kg (180 lb)     Vital Signs with Ranges  Temp:  [97.5  F (36.4  C)-98.3  F (36.8  C)] 97.5  F (36.4  C)  Pulse:  [59] 59  Heart Rate:  [59] 59  Resp:  [16-18] 18  BP: (123-141)/(85-97) 141/97  SpO2:  [94 %-97 %] 94 %  No intake/output data recorded.    Constitutional: Awake, alert, mildly anxious  Respiratory: Bilateral air entry, no wheezing, no rales, no crackles  Cardiovascular: S1S2, RRR, no murmurs, no rubs  GI: abd- soft, nonT, nonD, B Spresent  Skin/Integumen: no rashes, no cyanosis  Extremities- no leg swelling      Medications       escitalopram  10 mg Oral Daily     folic acid  1 mg Oral Daily     influenza vaccine adult (product based on age)  0.5 mL Intramuscular Prior to discharge     multivitamin w/minerals  1 tablet Oral Daily     omeprazole  20 mg Oral QAM AC     QUEtiapine  50 mg Oral At Bedtime     vitamin B1  100 mg Oral Daily       Data   Recent Labs   Lab 04/26/19  0737 04/25/19  0920 04/25/19  0043   WBC  --   --  6.3   HGB  --   --  15.7   MCV  --   --  86   PLT  --   --  279   INR  --   --  0.92    142 142   POTASSIUM 3.9 3.9 3.2*   CHLORIDE 105 108 106   CO2 28 29 28   BUN 10 12 14   CR 1.00 0.90 1.05   ANIONGAP 5 5 8   KIMMY 8.9 8.5 9.0   GLC 90 84 123*   ALBUMIN  --   --  4.2   PROTTOTAL  --   --  8.9*   BILITOTAL  --   --  0.4   ALKPHOS  --   --  85   ALT  --   --  44   AST  --   --  36   LIPASE  --   --  166       No results found for this or any previous visit (from the past  24 hour(s)).

## 2019-04-26 NOTE — PLAN OF CARE
DATE & TIME: 4/26, 0530  ORIENTATION: A&O x4.   BEHAVIOR & AGGRESSION TOOL COLOR: Green  CIWA SCORE: 0,0   ABNL VS/O2: VSS on RA  MOBILITY: SBA.   PAIN MANAGMENT: Denies  DIET: Reg   BOWEL/BLADDER:Continent, loose stools.    ABNL LAB/  DRAIN/DEVICES:    TELEMETRY RHYTHM: na   SKIN: Tattoos on arm  TESTS/PROCEDURES:  na   D/C DAY/GOALS/PLACE: Pending withdrawal symptoms and psychiatrist recommendation.   OTHER IMPORTANT INFO:

## 2019-04-26 NOTE — PLAN OF CARE
DATE & TIME: 4/25/2019 5935-5501  ORIENTATION: A&O x4.   BEHAVIOR & AGGRESSION TOOL COLOR: Green, calm and pleasant   CIWA SCORE: 8/4/2, for anxiety, sweats, and tremors. Valium 10 mg oral given once. Has anxiety at baseline prior to admission.   ABNL VS/O2: VSS on RA, elevated  /101, 132/85.  MOBILITY: SBA.   PAIN MANAGMENT: Denies  DIET: Reg tolerated.  BOWEL/BLADDER:Continent, loose stools.    ABNL LAB/  DRAIN/DEVICES: IVF infusing   TELEMETRY RHYTHM: na   SKIN: Tattoos on arm  TESTS/PROCEDURES:  na   D/C DAY/GOALS/PLACE: Pending withdrawal symptoms and psychiatrist recommendation.   OTHER IMPORTANT INFO:

## 2019-04-27 VITALS
DIASTOLIC BLOOD PRESSURE: 89 MMHG | BODY MASS INDEX: 22.38 KG/M2 | HEART RATE: 59 BPM | TEMPERATURE: 97.4 F | SYSTOLIC BLOOD PRESSURE: 135 MMHG | HEIGHT: 75 IN | OXYGEN SATURATION: 96 % | RESPIRATION RATE: 16 BRPM | WEIGHT: 180 LBS

## 2019-04-27 PROCEDURE — 90686 IIV4 VACC NO PRSV 0.5 ML IM: CPT | Performed by: INTERNAL MEDICINE

## 2019-04-27 PROCEDURE — H0001 ALCOHOL AND/OR DRUG ASSESS: HCPCS

## 2019-04-27 PROCEDURE — 99239 HOSP IP/OBS DSCHRG MGMT >30: CPT | Performed by: INTERNAL MEDICINE

## 2019-04-27 PROCEDURE — 25000132 ZZH RX MED GY IP 250 OP 250 PS 637: Performed by: INTERNAL MEDICINE

## 2019-04-27 PROCEDURE — 25000128 H RX IP 250 OP 636: Performed by: INTERNAL MEDICINE

## 2019-04-27 PROCEDURE — 25000132 ZZH RX MED GY IP 250 OP 250 PS 637: Performed by: PSYCHIATRY & NEUROLOGY

## 2019-04-27 RX ORDER — PROPRANOLOL HYDROCHLORIDE 10 MG/1
10 TABLET ORAL 3 TIMES DAILY
Qty: 100 TABLET | Refills: 3 | Status: SHIPPED | OUTPATIENT
Start: 2019-04-27

## 2019-04-27 RX ORDER — QUETIAPINE FUMARATE 25 MG/1
TABLET, FILM COATED ORAL
Qty: 40 TABLET | Refills: 0 | Status: SHIPPED | OUTPATIENT
Start: 2019-04-27

## 2019-04-27 RX ORDER — NALTREXONE HYDROCHLORIDE 50 MG/1
50 TABLET, FILM COATED ORAL DAILY
Qty: 30 TABLET | Refills: 0 | Status: SHIPPED | OUTPATIENT
Start: 2019-04-27

## 2019-04-27 RX ORDER — FAMOTIDINE 20 MG/1
TABLET, FILM COATED ORAL
Qty: 30 TABLET | Refills: 0 | Status: SHIPPED | OUTPATIENT
Start: 2019-04-27

## 2019-04-27 RX ORDER — ESCITALOPRAM OXALATE 10 MG/1
10 TABLET ORAL DAILY
Qty: 30 TABLET | Refills: 3 | Status: SHIPPED | OUTPATIENT
Start: 2019-04-28

## 2019-04-27 RX ADMIN — Medication 100 MG: at 10:25

## 2019-04-27 RX ADMIN — Medication 1 MG: at 00:29

## 2019-04-27 RX ADMIN — INFLUENZA A VIRUS A/MICHIGAN/45/2015 X-275 (H1N1) ANTIGEN (FORMALDEHYDE INACTIVATED), INFLUENZA A VIRUS A/SINGAPORE/INFIMH-16-0019/2016 IVR-186 (H3N2) ANTIGEN (FORMALDEHYDE INACTIVATED), INFLUENZA B VIRUS B/PHUKET/3073/2013 ANTIGEN (FORMALDEHYDE INACTIVATED), AND INFLUENZA B VIRUS B/MARYLAND/15/2016 BX-69A ANTIGEN (FORMALDEHYDE INACTIVATED) 0.5 ML: 15; 15; 15; 15 INJECTION, SUSPENSION INTRAMUSCULAR at 10:28

## 2019-04-27 RX ADMIN — OMEPRAZOLE 20 MG: 20 CAPSULE, DELAYED RELEASE ORAL at 08:32

## 2019-04-27 RX ADMIN — ACETAMINOPHEN 650 MG: 325 TABLET, FILM COATED ORAL at 00:24

## 2019-04-27 RX ADMIN — ESCITALOPRAM OXALATE 10 MG: 10 TABLET ORAL at 08:33

## 2019-04-27 RX ADMIN — MULTIPLE VITAMINS W/ MINERALS TAB 1 TABLET: TAB at 10:25

## 2019-04-27 RX ADMIN — PROPRANOLOL HYDROCHLORIDE 10 MG: 10 TABLET ORAL at 08:32

## 2019-04-27 RX ADMIN — FOLIC ACID 1 MG: 1 TABLET ORAL at 10:25

## 2019-04-27 ASSESSMENT — ACTIVITIES OF DAILY LIVING (ADL)
ADLS_ACUITY_SCORE: 10
ADLS_ACUITY_SCORE: 11

## 2019-04-27 NOTE — DISCHARGE SUMMARY
[facility]    Discharge Summary  Hospitalist    Date of Admission:  4/25/2019  Date of Discharge:  4/27/2019  Discharging Provider: Donovan Greenwood MD    Discharge Diagnoses   Active Problems:    Alcohol withdrawal (H)    Hypokalemia    Nausea/Vomiting    OCD    Anxiety with Panic Attacks    Hemorrhoids        History of Present Illness   34 year-old male who presents with nausea, vomiting, and has been drinking 2 pints of vodka daily despite participating in outpatient alcohol treatment.  He finally decided that he could no longer continue drinking like he has and needed help which led to him seeking care.  His last drink was the afternoon of 4/24. Since then he has started to develop tremors.  He has also been having for the past few days intermittent nausea and episodes of nonbloody vomiting, and generalized abdominal muscle soreness. He has had episodes of hyperventilation, tingling in his hands and chest pain associated with anxiety and panic.  He has occasional blood on the wipe and bright red blood in toilet water present for years with known history of hemorrhoids. No dark or tarry stools. He has been previously diagnosed with anxiety and obsessive-compulsive disorder, previously followed with a psychiatrist though has not seen one recently.     In the Emergency Department, with Initial vital signs were a temperature 98.7F, HR 85, /113, RR 17, SpO2 99% on room air. Work-up included normal CBC, INR, LFTs, lipase. Ethanol level 0.26. Potassium 3.2.    Hospital Course   Admitted to medical floor, placed on alcohol withdrawal protocol, potassium replaced, Hgb stable, and was seen by Psychiatry who thought the patient will benefit from chemical use assessment and ultimate transfer to inpatient CD treatment. The patient reports symptoms of depression as well as delusional thinking and mixed obsessional thoughts.  He will benefit from commencement of Lexapro at 10 mg daily given the  fact that his sister is doing well on this medication in addition to using quetiapine at 50 mg at bedtime and 25-50 mg q.6 hours prn initially.  He also wanted to try Naltrexone to help decrease his urge to drink -- 50 mg daily prescribed.     Donovan Greenwood MD  Pager: [phone]  Cell Phone:  [phone]       Significant Results and Procedures   As above    Pending Results   These results will be followed up by Dr. Greenwood  Unresulted Labs Ordered in the Past 30 Days of this Admission     No orders found from 2/24/2019 to 4/26/2019.          Code Status   Full Code       Primary Care Physician   Physician No Ref-Primary    Physical Exam                      Vitals:    04/25/19 0059   Weight: 81.6 kg (180 lb)     Vital Signs with Ranges     I/O last 3 completed shifts:  In: 820 [P.O.:820]  Out: -     Exam on discharge:   Alert, Ox3, cooperative    # Discharge Pain Plan:   - Patient currently has NO PAIN and is not being prescribed pain medications on discharge.      Discharge Disposition   Discharged to home  Condition at discharge: Stable    Consultations This Hospital Stay   PSYCHIATRY IP CONSULT  CHEMICAL DEPENDENCY IP CONSULT  CHEMICAL DEPENDENCY IP CONSULT  PSYCHIATRY IP CONSULT  CARE TRANSITION RN/SW IP CONSULT    Time Spent on this Encounter   I spent a total of 35 minutes discharging this patient.     Discharge Orders      Reason for your hospital stay    Anxiety with alcohol withdrawal.     Follow-up and recommended labs and tests     Keep appointment with [facility] on Thursday, May 1, 2019, as scheduled.     Activity    Your activity upon discharge: activity as tolerated     Discharge Instructions    Call Dr. Greenwood at Pager [phone] if questions, or Cell Phone [phone].     Full Code     Diet    Follow this diet upon discharge: Orders Placed This Encounter      Regular Diet Adult     Discharge Medications   Discharge Medication List as of 4/27/2019  2:36 PM      START  taking these medications    Details   escitalopram (LEXAPRO) 10 MG tablet Take 1 tablet (10 mg) by mouth daily, Disp-30 tablet, R-3, E-PrescribeFuture refills by PCP  Physician No Ref-Primary with phone number None.      famotidine (PEPCID) 20 MG tablet 1 pill twice a day for heartburn for 1 week, then as needed, Disp-30 tablet, R-0, E-Prescribe      naltrexone (DEPADE/REVIA) 50 MG tablet Take 1 tablet (50 mg) by mouth daily, Disp-30 tablet, R-0, E-PrescribeFuture refills by PCP  Physician No Ref-Primary with phone number None.      propranolol (INDERAL) 10 MG tablet Take 1 tablet (10 mg) by mouth 3 times daily, Disp-100 tablet, R-3, E-PrescribeFuture refills by PCP  Physician No Ref-Primary with phone number None.      QUEtiapine (SEROQUEL) 25 MG tablet 2 pills at bedtime for sleep, and 1 pill twice a day as needed for anxiety., Disp-40 tablet, R-0, E-PrescribeFuture refills by PCP  Physician No Ref-Primary with phone number None.         STOP taking these medications       FLUoxetine (PROZAC) 20 MG capsule Comments:   Reason for Stopping:             Allergies   No Known Allergies  Data   Most Recent 3 CBC's:  Recent Labs   Lab Test 04/25/19  0043   WBC 6.3   HGB 15.7   MCV 86         Most Recent 3 BMP's:  Recent Labs   Lab Test 04/26/19  0737 04/25/19  0920 04/25/19  0043    142 142   POTASSIUM 3.9 3.9 3.2*   CHLORIDE 105 108 106   CO2 28 29 28   BUN 10 12 14   CR 1.00 0.90 1.05   ANIONGAP 5 5 8   KIMMY 8.9 8.5 9.0   GLC 90 84 123*     Most Recent 2 LFT's:  Recent Labs   Lab Test 04/25/19  0043   AST 36   ALT 44   ALKPHOS 85   BILITOTAL 0.4     Most Recent INR's and Anticoagulation Dosing History:  Anticoagulation Dose History     Recent Dosing and Labs Latest Ref Rng & Units 4/25/2019    INR 0.86 - 1.14 0.92        Most Recent 3 Troponin's:No lab results found.  Most Recent Cholesterol Panel:No lab results found.  Most Recent 6 Bacteria Isolates From Any Culture (See EPIC Reports for  Culture Details):No lab results found.  Most Recent TSH, T4 and A1c Labs:No lab results found.

## 2019-04-27 NOTE — PROGRESS NOTES
Discharge    Patient discharged to home via taxi service with self   Care plan note: vss, alert x4, denied pain. +BS, lungs clear, on RA. IND in room. CIWA 0/0. Tolerating diet. Per pt, an appointment was already made with [facility] for 5/2/2019 1600. Pt is to follow the appointment. Meds filled here, discharge info given, pt verbalized understanding. Declined wheelchair and escort to hospital exit--He had been ambulating in unit multiple times/shift. Very steady on feet. Pt discharged around 1500 in stable condition.     Listed belongings gathered and returned to patient. No  Care Plan and Patient education resolved: Yes  Prescriptions if needed, hard copies sent with patient  NA  Home and hospital acquired medications returned to patient: NA  Medication Bin checked and emptied on discharge Yes  Follow up appointment made for patient: Yes
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
## 2019-04-27 NOTE — PLAN OF CARE
"ORIENTATION: A&Ox4, Mild-severe anxiety at times. Quetiapine PRN used, propanolol ordered TID.    BEHAVIOR & AGGRESSION TOOL COLOR: Green   CIWA SCORE: 4, 4 for baseline anxiety.   ABNL VS/O2: BP in the 150's, MD aware and ordered medications for pt   MOBILITY: Independent, walking halls freq to help reduce anxiety.   PAIN MANAGMENT: Intermittent chest tightness with anxiety/panic attacks.   DIET: Improved appetite today. Ate all of dinner. Good oral intake.   BOWEL/BLADDER: WDL, loose stools, pt reported he has not eaten in a few days prior to admission.   ABNL LAB/BG: WDL   DRAIN/DEVICES: IV SL RUE   TELEMETRY RHYTHM: NA   SKIN: WDL, tattoos   TESTS/PROCEDURES: NA   D/C DAY/GOALS/PLACE: TBD, pt calling facility to check himself in on discharge to assist with drinking issues. Pt stated \"that I have been there before, and will need to start all over again\".   OTHER IMPORTANT INFO: Pt appreciative of care.   "

## 2019-04-27 NOTE — CONSULTS
As weekend substance use evaluator (CD ), I met with Jelani for a Rule 25. He wants inpatient treatment, preferably to return to [facility]. He signed ROIs for [facility] and [facility]. He has an appointment with [facility] on Thursday. He wants to start Naltrexone or Vivitrol. I told him to ask [facility] doctor if that can be prescribed here.

## 2019-04-27 NOTE — PLAN OF CARE
.DATE & TIME: 04/27/2019 night shift  ORIENTATION: A&Ox4, Mild anxiety at times.  BEHAVIOR & AGGRESSION TOOL COLOR: Green   CIWA SCORE: 1 (mild anxiety) ,0 (sleeping  ABNL VS/O2: BP in the 144/106 other VSS on RA  MOBILITY: Independent  PAIN MANAGMENT: Back pain 6/10, managed with PRN tylenol  DIET: Regular, good appetite  BOWEL/BLADDER: continent  ABNL LAB/BG: [facility]   DRAIN/DEVICES: IV SL RUE   TELEMETRY RHYTHM: NA   SKIN: [facility], tattoos   TESTS/PROCEDURES: NA   D/C DAY/GOALS/PLACE: TBD,   OTHER IMPORTANT INFO:

## 2019-04-27 NOTE — PROGRESS NOTES
To Whom it May Concern,      Jelani Mcclain JrSharlene was admitted to [facility] on 4/25/2019 and discharged on 4/27/2019.  The patient is medically stable to return to work on Monday, May 5, 2019, or sooner if better.      If questions, please call Dr. Mercado at Pager [phone], or Cell Phone [phone].      Sincerely,      Dr. Donovan Mercado  [facility]  Phone [phone]

## 2019-04-27 NOTE — PROGRESS NOTES
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 is yet to do.  He states he has been getting refills for his prescribed fluoxetine through [facility].  He reports intermittent sobriety from alcohol and states he was last at residential treatment at [facility] in [place] last August.  He states he was able to stay sober for about 2 months, following which he gradually began to dabble into alcohol use again.  He states he had lost his job in 02/2018 on account of his alcohol use and tardiness, on account of which he sought help.  He tells me that he is in a very good position at this time in terms of work, but he has gradually escalated his use of alcohol, to the point of consuming 2 pints of vodka a day.  He states he buys them a pint at a time and usually consumes a pint within 2 hours of buying it.  He states by the end of the day, he has consumed 2 pints, but claims he is able to maintain his role at work, where he attends meetings and does not necessarily appear impaired to others.  However, he tells me that he is very forgetful and paranoid.  He states he forgets when things have happened as well as things he is supposed to do, and he is afraid that this will ultimately become a bigger problem and cause him to lose his job  again.  He denies any history of alcohol withdrawal seizures or blackouts.  He states he never really recognized the symptoms of alcohol withdrawal until this time.  He tells me he wants to quit using and is open to pursuing residential treatment since he is currently in outpatient treatment and continues to drink.     3. What activities have you engaged in when using alcohol/other drugs that could be hazardous to you or others (i.e. driving a car/motorcycle/boat, operating machinery, unsafe sex, sharing needles for drugs or tattoos, etc     Driving, unsafe situations. **Per 12/08/2016 Rule 25: Driving,unsafe sex,being in unsafe neighborhoods, associating with unsafe people, sharing needles, hung self outside of 22 story building    4. How much time do you spend getting, using or getting over using alcohol or drugs? (DSM)     He drinks throughout the day.     5. Reasons for drinking/drug use (Use the space below to record answers. It may not be necessary to ask each item.)  Like the feeling Yes   Trying to forget problems Yes   To cope with stress Yes   To relieve physical pain No   To cope with anxiety Yes   To cope with depression Yes   To relax or unwind Yes   Makes it easier to talk with people No   Partner encourages use Yes   Most friends drink or use Some   To cope with family problems No   Afraid of withdrawal symptoms/to feel better Yes   Other (specify)  No     A. What concerns other people about your alcohol or drug use/Has anyone told you that you use too much? What did they say? (DSM)     Girlfriend, family, hospital.     B. What did you think about that/ do you think you have a problem with alcohol or drug use?     - Alcohol - yes   - Cocaine - yes  **Per 05/30/2018 Rule 25: Age 14 - when he realized he had problem with all drugs in general. Doesn't matter the catalyst, but he needs to get more and doesn't matter which drug it is.     6. What changes are you willing to make? What substance are you  willing to stop using? How are you going to do that? Have you tried that before? What interfered with your success with that goal?      He wants inpatient treatment.     7. What would be helpful to you in making this change?     Inpatient treatment.    Dimension IV Ratings   Readiness for Change - The placing authority must use the criteria in Dimension IV to determine a client s readiness for change.   RISK DESCRIPTIONS - Severity ratin Client displays verbal compliance, but lacks consistent behaviors; has low motivation for change; and is passively involved in treatment.    REASONS SEVERITY WAS ASSIGNED - (What information did the person provide that supports your assessment of his or her readiness to change? How aware is the person of problems caused by continued use? How willing is she or he to make changes? What does the person feel would be helpful? What has the person been able to do without help?)      Patient identified a problem with alcohol and other drugs when he uses them. He is willing to start inpatient treatment. He has not followed through with past recommendations. Patient lacks insight into the effects his use has had on his physical and mental health.     DIMENSION V - Relapse, Continued Use, and Continued Problem Potential   1A. In what ways have you tried to control, cut-down or quit your use? If you have had periods of sobriety, how did you accomplish that? What was helpful? What happened to prevent you from continuing your sobriety? (DSM)     Recent - In 2018, he went to inpatient treatment at [facility]. He was sober for a month. In 2018, he started a new job. He was in outpatient treatment at [facility]. He thought things were going well and thought could start drinking again. His girlfriend started noticing a change and told him that he can't drink. He didn't listen and he continued drinking.   Longest - 5 months sober in 2016 - he stated he was fearful of going back to  [facility] and losing everything. He ended up losing his job and things anyway. When he thinks of drinking, he stated he needs to associate it with the negatives and not how much better he will feel for a short time.        1B. What were the circumstances of your most recent relapse with mood altering chemicals?    He thinks he is doing good and can drink.     2. Have you experienced cravings? If yes, ask follow up questions to determine if the person recognizes triggers and if the person has had any success in dealing with them.     - Cravings - Yes  - Triggers - Anxiety    3. Have you been treated for alcohol/other drug abuse/dependence?     Age 13 - treatment **Per 2016 Rule 25: He was forced to go into inpt tx at 15, he completed it but actually drank in the tx but never got caught.  Age 17 - treatment  2016 - [facility]  2018 - inpatient - [facility]     4. Support group participation: Have you/do you attend support group meetings to reduce/stop your alcohol/drug use? How recently? What was your experience? Are you willing to restart? If the person has not participated, is he or she willing?     He wasn't getting anything out of AA meetings. He wasn't fully committed to it. He was going through motions. He was going to 2 groups and would be willing to try them again    5. What would assist you in staying sober/straight?     Inpatient treatment    Dimension V Ratings   Relapse/Continued Use/Continued problem potential - The placing authority must use the criteria in Dimension V to determine a client s relapse, continued use, and continued problem potential.   RISK DESCRIPTIONS - Severity ratin No awareness of the negative impact of mental health problems or substance abuse. No coping skills to arrest mental health or addiction illnesses, or prevent relapse.    REASONS SEVERITY WAS ASSIGNED - (What information did the person provide that indicates his or her understanding of relapse issues?  What about the person s experience indicates how prone he or she is to relapse? What coping skills does the person have that decrease relapse potential?)      Patient reported four past treatments and no current support group attendance. He reported having minimal sober time. He has tried to quit using and drinking in the past but returned to use. Patient lacks knowledge of the addiction cycle, use pattern, warning signs, and triggers. He lacks impulse control, sober coping skills, and long-term sober maintenance skills. Patient is at a high risk for relapse/continued use. He has co-occurring mental health and substance use issues, which places him at higher risk for continued use.      DIMENSION VI - Recovery Environment   1. Are you employed/attending school? Tell me about that.     Patient has worked as an  at [employer] since 09/2018. He is unsure if will have a job when he gets out of the hospital. He hopes so. He stated he would miss work due to drinking.        2A. Describe a typical day; evening for you. Work, school, social, leisure, volunteer, spiritual practices. Include time spent obtaining, using, recovering from drugs or alcohol. (DSM)     He drinks throughout the day. He misses work due to drinking.     Please describe what leisure activities have been associated with your substance abuse:     The patient denied having any leisure activities which had been associated with his substance abuse.    2B. How often do you spend more time than you planned using or use more than you planned? (DSM)     Each time    3. How important is using to your social connections? Do many of your family or friends use?     Alcohol is not. He used cocaine with his girlfriend.     4A. Are you currently in a significant relationship?     He has been dating his girlfriend for 10 years. She is still probably using coaine. She does not drink, but smokes marijuana and has anxiety.     4C. Sexual Orientation:      Heterosexual    5A. Who do you live with?      About 2 months ago, he was living with his girlfriend at that time and they were using cocaine together. They were stressing each other out. Now he lives alone and has no accountability and he just drinks. He recently moved to the [facility] and is living in an Airbnb. He has lodging there for the next month. He wants inpatient treatment.    5B. Tell me about their alcohol/drug use and mental health issues.     He lives alone and drinks alone now.     5C. Are you concerned for your safety there? No    5D. Are you concerned about the safety of anyone else who lives with you? No    6A. Do you have children who live with you?     No    6B. Do you have children who do not live with you?     No    7A. Who supports you in making changes in your alcohol or drug use? What are they willing to do to support you? Who is upset or angry about you making changes in your alcohol or drug use? How big a problem is this for you?      Girlfriend    7B. This table is provided to record information about the person s relationships and available support It is not necessary to ask each item; only to get a comprehensive picture of their support system.  How often can you count on the following people when you need someone?   Partner / Spouse Usually supportive   Parent(s)/Aunt(s)/Uncle(s)/Grandparents Usually supportive   Sibling(s)/Cousin(s) Always supportive - His brother is a support. His brother recently got sober.    Child(anselmo) The patient doesn't have any current contact with children.   Other relative(s) The patient doesn't have any current contact with other relatives.   Friend(s)/neighbor(s) Rarely supportive   Child(anselmo) s father(s)/mother(s) The patient doesn't have any current contact with children(s) mother or father.   Support group member(s) The patient denied having any current involvement with 12-step or other support group meetings.   Community of ruperto members The  patient denied having any current involvement with community ruperto members.   /counselor/therapist/healer The patient denied having any current involvement with a , counselor, therapist or healer.   Other (specify) No     8A. What is your current living situation?     About 2 months ago, he was living with his girlfriend at that time and they were using cocaine together. They were stressing each other out. Now he lives alone and has no accountability and he just drinks. He recently moved to the [facility] and is living in an Airbnb. He has lodging there for the next month. He wants inpatient treatment.    8B. What is your long term plan for where you will be living?     He will get his own place. He hasn't thought about sober living.     8C. Tell me about your living environment/neighborhood? Ask enough follow up questions to determine safety, criminal activity, availability of alcohol and drugs, supportive or antagonistic to the person making changes.      Patient reported his neighborhood is safe. He reported it is easy to get alcohol.     9. Criminal justice history: Gather current/recent history and any significant history related to substance use--Arrests? Convictions? Circumstances? Alcohol or drug involvement? Sentences? Still on probation or parole? Expectations of the court? Current court order? Any sex offenses - lifetime? What level? (DSM)    DUI -  and . Patient denied current legal involvement.    10. What obstacles exist to participating in treatment? (Time off work, childcare, funding, transportation, pending [facility] time, living situation)     The patient denied having any obstacles for participating in substance abuse treatment.    Dimension VI Ratings   Recovery environment - The placing authority must use the criteria in Dimension VI to determine a client s recovery environment.   RISK DESCRIPTIONS - Severity ratin Client has (A) Chronically antagonistic  significant other, living environment, family, peer group or long-term criminal justice involvement that is harmful to recovery or treatment progress, or (B) Client has an actively antagonistic significant other, family, work, or living environment with immediate threat to the client's safety and well-being.    REASONS SEVERITY WAS ASSIGNED - (What support does the person have for making changes? What structure/stability does the person have in his or her daily life that will increase the likelihood that changes can be sustained? What problems exist in the person s environment that will jeopardize getting/staying clean and sober?)     Patient is staying at an [facility] for the next month or until he gets into treatment. His current living situation is unsupportive toward recovery, as he primarily drinks alone. He reported having relationship conflict with family and girlfriend due to his ongoing substance use. Patient lacks a current sober support network. He denied having any concerns regarding immediate living environment or neighborhood. Patient is employed, but at risk of losing his job. He lacks a daily structure and meaningful activities. He reported legal history of two DUIs.      Client Choice/Exceptions   Would you like services specific to language, age, gender, culture, Anabaptism preference, race, ethnicity, sexual orientation or disability?  Yes - with mental health    What particular treatment choices and options would you like to have? Inpatient.    Do you have a preference for a particular treatment program? [facility], but he doesn't think his insurance covers it.     Criteria for Diagnosis     Criteria for Diagnosis  DSM-5 Criteria for Substance Use Disorder  Instructions: Determine whether the client currently meets the criteria for Substance Use Disorder using the diagnostic criteria in the DSM-V pp.[phone]. Current means during the most recent 12 months outside a facility that controls access to  substances    Category of Substance Severity (ICD-10 Code / DSM 5 Code)     Alcohol Use Disorder Severe  (10.20) (303.90)   Cannabis Use Disorder The patient does not meet the criteria for a Cannabis use disorder.   Hallucinogen Use Disorder The patient does not meet the criteria for a Hallucinogen use disorder.   Inhalant Use Disorder The patient does not meet the criteria for an Inhalant use disorder.   Opioid Use Disorder The patient does not meet the criteria for an Opioid use disorder.   Sedative, Hypnotic, or Anxiolytic Use Disorder The patient does not meet the criteria for a Sedative/Hypnotic use disorder.   Stimulant Related Disorder Moderate   (F14.20) (304.20) Cocaine   Tobacco Use Disorder The patient does not meet the criteria for a Tobacco use disorder.   Other (or unknown) Substance Use Disorder The patient does not meet the criteria for a Other (or unknown) Substance use disorder.     Collateral Contact Summary   Number of contacts made: 0  Contact with referring person:  No  If court related records were reviewed, summarize here: No court records had been reviewed at the time of this documentation.    Rule 25 Assessment Summary and Plan   's Recommendation    It is recommended that patient:  1). Participate in and complete a co-occurring lodging/residential substance use treatment program at [facility].  2). Follow all subsequent recommendations of the substance use treatment providers.   3). Abstain from alcohol and all mood-altering substances, except as prescribed. Take all medications as prescribed.   4). Attend AA at least four times weekly and obtain a male sponsor for additional sober supports.   5). Become involved in a daily sober recreational activity/hobby of his own interest.  6). Discuss with a doctor/psychiatrist the use of Naltrexone, Campral, or Antabuse to decrease cravings and assist with sobriety.   7). Begin weekly individual mental health therapy.  Work with primary counselor to address suicidal ideation and complete a Patient Safety Plan.     Collateral Contacts     Name:    [facility] Relationship:     Phone Number:     Releases:       nic Contact  Criteria for Diagnosis     A problematic pattern of alcohol/drug use leading to clinically significant impairment or distress, as manifested by at least two of the following, occurring within a 12-month period: 11/11    1.) Alcohol/drug is often taken in larger amounts or over a longer period than was intended.  2.) There is a persistent desire or unsuccessful efforts to cut down or control alcohol/drug use  3.) A great deal of time is spent in activities necessary to obtain alcohol, use alcohol, or recover from its effects.  4.) Craving, or a strong desire or urge to use alcohol/drug  5.) Recurrent alcohol/drug use resulting in a failure to fulfill major role obligations at work, school or home.  6.) Continued alcohol use despite having persistent or recurrent social or interpersonal problems caused or exacerbated by the effects of alcohol/drug.  7.) Important social, occupational, or recreational activities are given up or reduced because of alcohol/drug use.  8.) Recurrent alcohol/drug use in situations in which it is physically hazardous.  9.) Alcohol/drug use is continued despite knowledge of having a persistent or recurrent physical or psychological problem that is likely to have been caused or exacerbated by alcohol.  10.) Tolerance, as defined by either of the following: A need for markedly increased amounts of alcohol/drug to achieve intoxication or desired effect.  11.) Withdrawal, as manifested by either of the following: The characteristic withdrawal syndrome for alcohol/drug (refer to Criteria A and B of the criteria set for alcohol/drug withdrawal).    Specify if: In early remission:  After full criteria for alcohol/drug use disorder were previously met, none of the criteria for  alcohol/drug use disorder have been met for at least 3 months but for less than 12 months (with the exception that Criterion A4,  Craving or a strong desire or urge to use alcohol/drug  may be met).     In sustained remission:   After full criteria for alcohol use disorder were previously met, none of the criteria for alcohol/drug use disorder have been met at any time during a period of 12 months or longer (with the exception that Criterion A4,  Craving or strong desire or urge to use alcohol/drug  may be met).   Specify if:   This additional specifier is used if the individual is in an environment where access to alcohol is restricted.    Mild: Presence of 2-3 symptoms  Moderate: Presence of 4-5 symptoms  Severe: Presence of 6 or more symptoms

## 2019-04-29 ENCOUNTER — TELEPHONE (OUTPATIENT)
Dept: FAMILY MEDICINE | Facility: CLINIC | Age: 34
End: 2019-04-29

## 2019-04-29 NOTE — TELEPHONE ENCOUNTER
Phone number listed for patient was not answered and voicemail was for someone other than patient.    No other phone number listed for patient.    Patient does not have [employer] account.    Closing this encounter.    DOMENICA ChampionN, RN

## 2020-10-02 ENCOUNTER — ALLIED HEALTH/NURSE VISIT (OUTPATIENT)
Dept: FAMILY MEDICINE | Facility: OTHER | Age: 35
End: 2020-10-02
Payer: COMMERCIAL

## 2020-10-02 DIAGNOSIS — Z20.822 COVID-19 RULED OUT: Primary | ICD-10-CM

## 2020-10-02 PROCEDURE — C9803 HOPD COVID-19 SPEC COLLECT: HCPCS

## 2020-10-02 PROCEDURE — 99207 PR NO CHARGE LOS: CPT

## 2020-10-02 PROCEDURE — U0003 INFECTIOUS AGENT DETECTION BY NUCLEIC ACID (DNA OR RNA); SEVERE ACUTE RESPIRATORY SYNDROME CORONAVIRUS 2 (SARS-COV-2) (CORONAVIRUS DISEASE [COVID-19]), AMPLIFIED PROBE TECHNIQUE, MAKING USE OF HIGH THROUGHPUT TECHNOLOGIES AS DESCRIBED BY CMS-2020-01-R: HCPCS | Mod: ZL | Performed by: FAMILY MEDICINE

## 2020-10-03 LAB
SARS-COV-2 RNA SPEC QL NAA+PROBE: NOT DETECTED
SPECIMEN SOURCE: NORMAL

## 2024-09-05 ENCOUNTER — ENROLLMENT (OUTPATIENT)
Dept: HOME HEALTH SERVICES | Facility: HOME HEALTH | Age: 39
End: 2024-09-05
Payer: COMMERCIAL

## 2024-10-04 NOTE — PROGRESS NOTES
AUTH REQUIRED DAY ORDERS (or CHANGE IN ORDERS) ARE RECEIVED. PLEASE NOTIFY PA TEAM ONCE ORDERS RECEIVED.    08/22/2024- PATIENT HAS COVERAGE FOR LINECARE, TPA, AND HYDRATION. ML

## 2024-11-04 ENCOUNTER — HOME INFUSION BILLING (OUTPATIENT)
Dept: HOME HEALTH SERVICES | Facility: HOME HEALTH | Age: 39
End: 2024-11-04
Payer: COMMERCIAL

## 2024-11-04 ENCOUNTER — HOME INFUSION (OUTPATIENT)
Dept: HOME HEALTH SERVICES | Facility: HOME HEALTH | Age: 39
End: 2024-11-04
Payer: COMMERCIAL

## 2024-11-04 DIAGNOSIS — F33.2 SEVERE RECURRENT MAJOR DEPRESSION WITHOUT PSYCHOTIC FEATURES (H): Primary | ICD-10-CM

## 2024-11-09 ENCOUNTER — HOME INFUSION (OUTPATIENT)
Dept: HOME HEALTH SERVICES | Facility: HOME HEALTH | Age: 39
End: 2024-11-09
Payer: COMMERCIAL

## 2024-12-02 ENCOUNTER — HOME INFUSION BILLING (OUTPATIENT)
Dept: HOME HEALTH SERVICES | Facility: HOME HEALTH | Age: 39
End: 2024-12-02
Payer: COMMERCIAL

## 2024-12-26 ENCOUNTER — HOME INFUSION (OUTPATIENT)
Dept: HOME HEALTH SERVICES | Facility: HOME HEALTH | Age: 39
End: 2024-12-26
Payer: COMMERCIAL

## 2024-12-30 ENCOUNTER — HOME INFUSION BILLING (OUTPATIENT)
Dept: HOME HEALTH SERVICES | Facility: HOME HEALTH | Age: 39
End: 2024-12-30
Payer: COMMERCIAL

## 2025-01-02 PROCEDURE — S0013 ESKETAMINE, NASAL SPRAY: HCPCS | Mod: JZ

## 2025-01-09 PROCEDURE — S0013 ESKETAMINE, NASAL SPRAY: HCPCS | Mod: JZ

## 2025-01-16 PROCEDURE — S0013 ESKETAMINE, NASAL SPRAY: HCPCS | Mod: JZ

## 2025-01-23 PROCEDURE — S0013 ESKETAMINE, NASAL SPRAY: HCPCS | Mod: JZ

## 2025-01-24 ENCOUNTER — HOME INFUSION (OUTPATIENT)
Dept: HOME HEALTH SERVICES | Facility: HOME HEALTH | Age: 40
End: 2025-01-24
Payer: COMMERCIAL

## 2025-01-27 ENCOUNTER — HOME INFUSION BILLING (OUTPATIENT)
Dept: HOME HEALTH SERVICES | Facility: HOME HEALTH | Age: 40
End: 2025-01-27
Payer: COMMERCIAL

## 2025-01-31 PROCEDURE — S0013 ESKETAMINE, NASAL SPRAY: HCPCS | Mod: JZ

## 2025-02-07 PROCEDURE — S0013 ESKETAMINE, NASAL SPRAY: HCPCS | Mod: JZ

## 2025-02-14 PROCEDURE — S0013 ESKETAMINE, NASAL SPRAY: HCPCS | Mod: JZ

## 2025-02-20 ENCOUNTER — HOME INFUSION (OUTPATIENT)
Dept: HOME HEALTH SERVICES | Facility: HOME HEALTH | Age: 40
End: 2025-02-20
Payer: COMMERCIAL

## 2025-02-21 PROCEDURE — S0013 ESKETAMINE, NASAL SPRAY: HCPCS | Mod: JZ

## 2025-02-24 ENCOUNTER — HOME INFUSION BILLING (OUTPATIENT)
Dept: HOME HEALTH SERVICES | Facility: HOME HEALTH | Age: 40
End: 2025-02-24
Payer: COMMERCIAL

## 2025-02-27 PROCEDURE — S0013 ESKETAMINE, NASAL SPRAY: HCPCS | Mod: JZ

## 2025-03-06 PROCEDURE — S0013 ESKETAMINE, NASAL SPRAY: HCPCS | Mod: JZ

## 2025-03-12 ENCOUNTER — HOME INFUSION (OUTPATIENT)
Dept: HOME HEALTH SERVICES | Facility: HOME HEALTH | Age: 40
End: 2025-03-12
Payer: COMMERCIAL

## 2025-03-12 DIAGNOSIS — F33.2 SEVERE RECURRENT MAJOR DEPRESSION WITHOUT PSYCHOTIC FEATURES (H): Primary | ICD-10-CM

## 2025-03-13 PROCEDURE — S0013 ESKETAMINE, NASAL SPRAY: HCPCS | Mod: JZ

## 2025-03-20 PROCEDURE — S0013 ESKETAMINE, NASAL SPRAY: HCPCS | Mod: JZ

## 2025-03-24 ENCOUNTER — HOME INFUSION BILLING (OUTPATIENT)
Dept: HOME HEALTH SERVICES | Facility: HOME HEALTH | Age: 40
End: 2025-03-24
Payer: COMMERCIAL

## 2025-03-27 PROCEDURE — S0013 ESKETAMINE, NASAL SPRAY: HCPCS | Mod: JZ

## 2025-04-03 PROCEDURE — S0013 ESKETAMINE, NASAL SPRAY: HCPCS | Mod: JZ

## 2025-04-10 PROCEDURE — S0013 ESKETAMINE, NASAL SPRAY: HCPCS | Mod: JZ

## 2025-04-17 ENCOUNTER — HOME INFUSION (OUTPATIENT)
Dept: HOME HEALTH SERVICES | Facility: HOME HEALTH | Age: 40
End: 2025-04-17
Payer: COMMERCIAL

## 2025-04-17 PROCEDURE — S0013 ESKETAMINE, NASAL SPRAY: HCPCS | Mod: JZ

## 2025-04-21 ENCOUNTER — HOME INFUSION BILLING (OUTPATIENT)
Dept: HOME HEALTH SERVICES | Facility: HOME HEALTH | Age: 40
End: 2025-04-21
Payer: COMMERCIAL

## 2025-04-24 PROCEDURE — S0013 ESKETAMINE, NASAL SPRAY: HCPCS | Mod: JZ

## 2025-05-01 PROCEDURE — S0013 ESKETAMINE, NASAL SPRAY: HCPCS | Mod: JZ

## 2025-05-08 PROCEDURE — S0013 ESKETAMINE, NASAL SPRAY: HCPCS | Mod: JZ

## 2025-05-15 PROCEDURE — S0013 ESKETAMINE, NASAL SPRAY: HCPCS | Mod: JZ

## 2025-05-19 ENCOUNTER — HOME INFUSION BILLING (OUTPATIENT)
Dept: HOME HEALTH SERVICES | Facility: HOME HEALTH | Age: 40
End: 2025-05-19
Payer: COMMERCIAL

## 2025-05-19 ENCOUNTER — HOME INFUSION (OUTPATIENT)
Dept: HOME HEALTH SERVICES | Facility: HOME HEALTH | Age: 40
End: 2025-05-19
Payer: COMMERCIAL

## 2025-05-22 PROCEDURE — S0013 ESKETAMINE, NASAL SPRAY: HCPCS | Mod: JZ

## 2025-05-29 PROCEDURE — S0013 ESKETAMINE, NASAL SPRAY: HCPCS | Mod: JZ

## 2025-06-05 PROCEDURE — S0013 ESKETAMINE, NASAL SPRAY: HCPCS | Mod: JZ

## 2025-06-12 PROCEDURE — S0013 ESKETAMINE, NASAL SPRAY: HCPCS | Mod: JZ

## 2025-06-13 ENCOUNTER — HOME INFUSION (OUTPATIENT)
Dept: HOME HEALTH SERVICES | Facility: HOME HEALTH | Age: 40
End: 2025-06-13
Payer: COMMERCIAL

## 2025-07-14 ENCOUNTER — HOME INFUSION BILLING (OUTPATIENT)
Dept: HOME HEALTH SERVICES | Facility: HOME HEALTH | Age: 40
End: 2025-07-14
Payer: COMMERCIAL

## 2025-07-21 ENCOUNTER — HOME INFUSION BILLING (OUTPATIENT)
Dept: HOME HEALTH SERVICES | Facility: HOME HEALTH | Age: 40
End: 2025-07-21
Payer: COMMERCIAL

## 2025-07-24 PROCEDURE — S0013 ESKETAMINE, NASAL SPRAY: HCPCS | Mod: JZ

## 2025-07-28 ENCOUNTER — HOME INFUSION BILLING (OUTPATIENT)
Dept: HOME HEALTH SERVICES | Facility: HOME HEALTH | Age: 40
End: 2025-07-28
Payer: COMMERCIAL

## 2025-07-31 PROCEDURE — S0013 ESKETAMINE, NASAL SPRAY: HCPCS | Mod: JZ

## 2025-08-04 ENCOUNTER — HOME INFUSION BILLING (OUTPATIENT)
Dept: HOME HEALTH SERVICES | Facility: HOME HEALTH | Age: 40
End: 2025-08-04
Payer: COMMERCIAL

## 2025-08-07 PROCEDURE — S0013 ESKETAMINE, NASAL SPRAY: HCPCS | Mod: JZ

## 2025-08-14 PROCEDURE — S0013 ESKETAMINE, NASAL SPRAY: HCPCS | Mod: JZ

## 2025-08-21 PROCEDURE — S0013 ESKETAMINE, NASAL SPRAY: HCPCS | Mod: JZ

## 2025-08-28 PROCEDURE — S0013 ESKETAMINE, NASAL SPRAY: HCPCS | Mod: JZ

## 2025-09-08 ENCOUNTER — HOME INFUSION BILLING (OUTPATIENT)
Dept: HOME HEALTH SERVICES | Facility: HOME HEALTH | Age: 40
End: 2025-09-08
Payer: COMMERCIAL